# Patient Record
Sex: FEMALE
[De-identification: names, ages, dates, MRNs, and addresses within clinical notes are randomized per-mention and may not be internally consistent; named-entity substitution may affect disease eponyms.]

---

## 2024-02-23 ENCOUNTER — TELEPHONE (OUTPATIENT)
Facility: HOSPITAL | Age: 49
End: 2024-02-23

## 2024-02-26 ENCOUNTER — HOSPITAL ENCOUNTER (OUTPATIENT)
Facility: HOSPITAL | Age: 49
Setting detail: RECURRING SERIES
Discharge: HOME | End: 2024-02-29
Payer: COMMERCIAL

## 2024-02-26 PROCEDURE — 97110 THERAPEUTIC EXERCISES: CPT

## 2024-02-26 PROCEDURE — 97161 PT EVAL LOW COMPLEX 20 MIN: CPT

## 2024-02-26 NOTE — PROGRESS NOTES
[facility]  [address] [address]  Phone: [phone]   Fax: [phone]    Plan of Care/ Statement of Necessity for Physical Therapy Services    Patient name: Magdalena Arriola Start of Care: 2024   Referral source: Ora Lutz PA : 1975    Medical Diagnosis: Pain in left knee [M25.562]  Pain in right knee [M25.561]      Onset Date:2023 ~    Treatment Diagnosis:  M25.561  RIGHT KNEE PAIN and M25.562  LEFT KNEE PAIN                                           Prior Hospitalization: see medical history Provider#: [id number]   Medications: Verified on Patient Summary List     Comorbidities: Thyroid, anxiety/depression   Prior Level of Function: Aerobic exercise, Ballet       The Plan of Care and following information is based on the information from the initial evaluation.    Assessment/ wright information: Magdalena Durán is a 50 yo female that  presents with left and right knee pain off and on over the last few years becoming progressively worse since 2023 when increasing exercise intensity in the gym. She has reduced bilateral LE strength, most notably lateral hip strength. She has decreased balance and ambulates with an antalgic gait pattern. Patient presentation is consistent with PFPS of  bilateral knees. Patient will benefit from skilled PT services to modify and progress therapeutic interventions, address functional mobility deficits, address ROM deficits, address strength deficits, analyze and address soft tissue restrictions, analyze and cue movement patterns, analyze and modify body mechanics/ergonomics and assess and modify postural abnormalities to attain her goals.     Evaluation Complexity HistoryMEDIUM  Complexity : 1-2 comorbidities / personal factors will impact the outcome/ POC  ; Examination LOW Complexity : 1-2 Standardized tests and measures addressing body structure, function, activity limitation and / or participation in recreation

## 2024-02-26 NOTE — PROGRESS NOTES
PT DAILY TREATMENT NOTE/KNEE EVAL 10-18    Patient Name: Magdalena Arriola  Date:2024  : 1975  [x]  Patient  Verified  Payor: JORGE / Plan: [employer]SEM[facility] [place] / Product Type: *No Product type* /    In time:1130  Out time:1225  Total Treatment Time (min): 55  Visit #: 1 of 16    Medicare/BCBS Only   Total Timed Codes (min):  23 1:1 Treatment Time:  55     Treatment Area: Pain in left knee [M25.562]  Pain in right knee [M25.561]    SUBJECTIVE  Pain Level (0-10 scale): 3  []constant [x]intermittent []improving []worsening []no change since onset    Any medication changes, allergies to medications, adverse drug reactions, diagnosis change, or new procedure performed?: [x] No    [] Yes (see summary sheet for update)  Subjective functional status/changes:       Patient presents with left and right knee pain off and on over the last few years becoming progressively worse since 2023 when increasing exercise intensity in the gym. Patient describes pain as ache. Denies numbness/tingling. Denies popping/clicking. Aggravating factors:Exercise, weight bearing activities. Alleviating factors: rest.  Denies red flags: SOB, chest pain, dizziness/lightheadedness, blurred/double vision, HA, chills/fevers, night sweats, change in bowel/bladder control, abdominal pain, difficulty swallowing, slurred speech, unexplained weight gain/loss, nausea, vomiting. PMHx: Thyroid, anxiety/depression. Surgical Hx: Skin CA re. Social Hx:  lives spouse in a two story home, work status: Professor. PLOF: Aerobic exercise, Ballet.     OBJECTIVE/EXAMINATION      32 min [x]Eval                  []Re-Eval       23 min Therapeutic Exercise:  [x] See flow sheet :   Rationale: increase ROM, increase strength and decrease pain to improve the patient’s ability to complete ADLs        With   [x] TE   [] TA   [] neuro   [] other: Patient Education: [x] Review HEP    [] Progressed/Changed HEP based on:   [] positioning   [] body mechanics   []

## 2024-02-28 ENCOUNTER — HOSPITAL ENCOUNTER (OUTPATIENT)
Facility: HOSPITAL | Age: 49
Setting detail: RECURRING SERIES
Discharge: HOME | End: 2024-03-02
Payer: COMMERCIAL

## 2024-02-28 PROCEDURE — 97112 NEUROMUSCULAR REEDUCATION: CPT

## 2024-02-28 PROCEDURE — 97530 THERAPEUTIC ACTIVITIES: CPT

## 2024-02-28 PROCEDURE — 97110 THERAPEUTIC EXERCISES: CPT

## 2024-02-28 NOTE — PROGRESS NOTES
8 weeks:                    Patient will report compliance with HEP a least 3-4x/week to aid in rehabilitation/strengthening program.                 Status at IE: NA                 Current:Same as IE                    Patient will increase bilateral LE strength to 5/5 MMT throughout to aid in completion of ADLs.                 Status at IE:4-/5                 Current:Same as IE                    Patient will report pain no greater than 0-1/10 throughout entire day to aid in completion of ADLs.                 Status at IE:2-3/10                 Current:Same as IE                    Patent will be able to walk on treadmill for 30 mins to be able to ambulate community distances to complete ADLs.                 Status at IE:2/10 pain                 Current:Same as IE                    Patient will improve FOTO score to 74 points to demonstrate improvement in functional status.                 Status at IE:FOTO score = 67 (an established functional score where 100 = no disability)                 Current:Same as IE     PLAN  Yes  Continue plan of care  []  Upgrade activities as tolerated  []  Discharge due to :  []  Other:    Brown Calvo PT    2/28/2024    11:57 AM    Future Appointments   Date Time Provider Department Center   2/28/2024 12:10 PM Brown Calvo, PT [facility]   3/4/2024  2:10 PM Brown Calvo, PT [facility]   3/6/2024  2:10 PM Brown Calvo, PT [facility]   3/11/2024 10:50 AM Brown Calvo, PT [facility]   3/13/2024  1:30 PM Brown Calvo, PT [facility]

## 2024-03-04 ENCOUNTER — HOSPITAL ENCOUNTER (OUTPATIENT)
Facility: HOSPITAL | Age: 49
Setting detail: RECURRING SERIES
Discharge: HOME | End: 2024-03-07
Payer: COMMERCIAL

## 2024-03-04 PROCEDURE — 97112 NEUROMUSCULAR REEDUCATION: CPT

## 2024-03-04 PROCEDURE — 97110 THERAPEUTIC EXERCISES: CPT

## 2024-03-04 PROCEDURE — 97530 THERAPEUTIC ACTIVITIES: CPT

## 2024-03-04 NOTE — PROGRESS NOTES
[id number] Therapeutic Activity (timed):  use of dynamic activities replicating functional movements to increase ROM, strength, coordination, balance, and proprioception in order to improve patient's ability to progress to PLOF and address remaining functional goals.  (see flow sheet as applicable)     Details if applicable:     54  [facility] Totals Reminder: bill using total billable min of TIMED therapeutic procedures (example: do not include dry needle or estim unattended, both untimed codes, in totals to left)  8-22 min = 1 unit; 23-37 min = 2 units; 38-52 min = 3 units; 53-67 min = 4 units; 68-82 min = 5 units   Total Total     TOTAL TREATMENT TIME:        54     [x]  Patient Education billed concurrently with other procedures   [x] Review HEP    [] Progressed/Changed HEP, detail:    [] Other detail:       Objective Information/Functional Measures/Assessment    Patient required cues to recall exercise mechanics and parameters. She demonstrated improved activity toerlance. Advanced exercise by introducing leg press. She was challenge with exercise prescribed.     Patient will continue to benefit from skilled PT services to modify and progress therapeutic interventions, analyze and address functional mobility deficits, analyze and address ROM deficits, analyze and address strength deficits, analyze and address soft tissue restrictions, analyze and cue for proper movement patterns, analyze and modify for postural abnormalities, analyze and address imbalance/dizziness, and instruct in home and community integration to address functional deficits and attain remaining goals.    Progress toward goals / Updated goals:  []  See Progress Note/Recertification    Short Term Goals: To be accomplished in 4 weeks:                 Patient will report compliance with HEP 1x/day to aid in rehabilitation program.                 Status at IE: provided initial HEP                 Current: In-progress, reviewed HEP, 2/28/2024     Long Term

## 2024-03-06 ENCOUNTER — HOSPITAL ENCOUNTER (OUTPATIENT)
Facility: HOSPITAL | Age: 49
Setting detail: RECURRING SERIES
Discharge: HOME | End: 2024-03-09
Payer: COMMERCIAL

## 2024-03-06 PROCEDURE — 97112 NEUROMUSCULAR REEDUCATION: CPT

## 2024-03-06 PROCEDURE — 97530 THERAPEUTIC ACTIVITIES: CPT

## 2024-03-06 PROCEDURE — 97110 THERAPEUTIC EXERCISES: CPT

## 2024-03-06 NOTE — PROGRESS NOTES
PHYSICAL / OCCUPATIONAL THERAPY - DAILY TREATMENT NOTE     Patient Name: Magdalena Arriola    Date: 3/6/2024    : 1975  Insurance: Payor: JORGE / Plan: [facility] [place] / Product Type: *No Product type* /      Patient  verified Yes     Visit #   Current / Total 4 16   Time   In / Out 1410 1455   Pain   In / Out 3 0   Subjective Functional Status/Changes: Patient reports that she has mild low back pain today.    Changes to:  Allergies, Med Hx, Sx Hx?   no       TREATMENT AREA =  Pain in left knee [M25.562]  Pain in right knee [M25.561]    OBJECTIVE    Modalities Rationale:     decrease edema, decrease inflammation, decrease pain, increase tissue extensibility, and increase muscle contraction/control to improve patient's ability to progress to PLOF and address remaining functional goals.    0 min [x]  Vasopneumatic Device, press/temp:    If using vaso (only need to measure limb vaso being performed on)      pre-treatment girth :       post-treatment girth :       measured at (landmark location) :      min []  Other:    Skin assessment post-treatment (if applicable):    []  intact    []  redness- no adverse reaction                 []redness - adverse reaction:         Therapeutic Procedures:  Tx Min Billable or 1:1 Min (if diff from Tx Min) Procedure, Rationale, Specifics   25  [id number] Therapeutic Exercise (timed):  increase ROM, strength, coordination, balance, and proprioception to improve patient's ability to progress to PLOF and address remaining functional goals. (see flow sheet as applicable)    Details if applicable:       10  82749 Neuromuscular Re-Education (timed):  improve balance, coordination, kinesthetic sense, posture, core stability and proprioception to improve patient's ability to develop conscious control of individual muscles and awareness of position of extremities in order to progress to PLOF and address remaining functional goals. (see flow sheet as applicable)    Details if applicable:     10

## 2024-03-11 ENCOUNTER — HOSPITAL ENCOUNTER (OUTPATIENT)
Facility: HOSPITAL | Age: 49
Setting detail: RECURRING SERIES
Discharge: HOME | End: 2024-03-14
Payer: COMMERCIAL

## 2024-03-11 PROCEDURE — 97112 NEUROMUSCULAR REEDUCATION: CPT

## 2024-03-11 PROCEDURE — 97530 THERAPEUTIC ACTIVITIES: CPT

## 2024-03-11 PROCEDURE — 97110 THERAPEUTIC EXERCISES: CPT

## 2024-03-11 NOTE — PROGRESS NOTES
Activity (timed):  use of dynamic activities replicating functional movements to increase ROM, strength, coordination, balance, and proprioception in order to improve patient's ability to progress to PLOF and address remaining functional goals.  (see flow sheet as applicable)     Details if applicable:     39  [facility] Totals Reminder: bill using total billable min of TIMED therapeutic procedures (example: do not include dry needle or estim unattended, both untimed codes, in totals to left)  8-22 min = 1 unit; 23-37 min = 2 units; 38-52 min = 3 units; 53-67 min = 4 units; 68-82 min = 5 units   Total Total     TOTAL TREATMENT TIME:       39     [x]  Patient Education billed concurrently with other procedures   [x] Review HEP    [] Progressed/Changed HEP, detail:    [] Other detail:       Objective Information/Functional Measures/Assessment    Advanced exercise by increasing resistance. Introduced core stabilization exercise via PPT with marching. Provided tactile cues to promote proper mechanics. She was challenged with exercise prescribed.     Patient will continue to benefit from skilled PT services to modify and progress therapeutic interventions, analyze and address functional mobility deficits, analyze and address ROM deficits, analyze and address strength deficits, analyze and address soft tissue restrictions, analyze and cue for proper movement patterns, analyze and modify for postural abnormalities, analyze and address imbalance/dizziness, and instruct in home and community integration to address functional deficits and attain remaining goals.    Progress toward goals / Updated goals:  []  See Progress Note/Recertification    Short Term Goals: To be accomplished in 4 weeks:                 Patient will report compliance with HEP 1x/day to aid in rehabilitation program.                 Status at IE: provided initial HEP                 Current: Met, 3/11/2024     Long Term Goals: To be accomplished in  8 weeks:

## 2024-03-13 ENCOUNTER — HOSPITAL ENCOUNTER (OUTPATIENT)
Facility: HOSPITAL | Age: 49
Setting detail: RECURRING SERIES
Discharge: HOME | End: 2024-03-16
Payer: COMMERCIAL

## 2024-03-13 PROCEDURE — 97110 THERAPEUTIC EXERCISES: CPT

## 2024-03-13 PROCEDURE — 97530 THERAPEUTIC ACTIVITIES: CPT

## 2024-03-13 PROCEDURE — 97112 NEUROMUSCULAR REEDUCATION: CPT

## 2024-03-18 ENCOUNTER — HOSPITAL ENCOUNTER (OUTPATIENT)
Facility: HOSPITAL | Age: 49
Setting detail: RECURRING SERIES
Discharge: HOME | End: 2024-03-21
Payer: COMMERCIAL

## 2024-03-18 PROCEDURE — 97530 THERAPEUTIC ACTIVITIES: CPT

## 2024-03-18 PROCEDURE — 97110 THERAPEUTIC EXERCISES: CPT

## 2024-03-18 PROCEDURE — 97112 NEUROMUSCULAR REEDUCATION: CPT

## 2024-03-18 NOTE — PROGRESS NOTES
PHYSICAL / OCCUPATIONAL THERAPY - DAILY TREATMENT NOTE     Patient Name: Magdalena Arriola    Date: 3/18/2024    : 1975  Insurance: Payor: JORGE / Plan: [facility] [place] / Product Type: *No Product type* /      Patient  verified Yes     Visit #   Current / Total 7 16   Time   In / Out 1413 1506   Pain   In / Out 2-3 0   Subjective Functional Status/Changes: Patient reports that she did 40 minutes on elliptical machine and has low back and bilateral knee soreness.    Changes to:  Allergies, Med Hx, Sx Hx?   no       TREATMENT AREA =  Pain in left knee [M25.562]  Pain in right knee [M25.561]    OBJECTIVE    Modalities Rationale:     decrease edema, decrease inflammation, decrease pain, increase tissue extensibility, and increase muscle contraction/control to improve patient's ability to progress to PLOF and address remaining functional goals.    0 min [x]  Vasopneumatic Device, press/temp:    If using vaso (only need to measure limb vaso being performed on)      pre-treatment girth :       post-treatment girth :       measured at (landmark location) :      min []  Other:    Skin assessment post-treatment (if applicable):    []  intact    []  redness- no adverse reaction                 []redness - adverse reaction:         Therapeutic Procedures:  Tx Min Billable or 1:1 Min (if diff from Tx Min) Procedure, Rationale, Specifics   25  64859 Therapeutic Exercise (timed):  increase ROM, strength, coordination, balance, and proprioception to improve patient's ability to progress to PLOF and address remaining functional goals. (see flow sheet as applicable)    Details if applicable:       10  52432 Neuromuscular Re-Education (timed):  improve balance, coordination, kinesthetic sense, posture, core stability and proprioception to improve patient's ability to develop conscious control of individual muscles and awareness of position of extremities in order to progress to PLOF and address remaining functional goals. (see flow

## 2024-03-20 ENCOUNTER — HOSPITAL ENCOUNTER (OUTPATIENT)
Facility: HOSPITAL | Age: 49
Setting detail: RECURRING SERIES
Discharge: HOME | End: 2024-03-23
Payer: COMMERCIAL

## 2024-03-20 PROCEDURE — 97112 NEUROMUSCULAR REEDUCATION: CPT

## 2024-03-20 PROCEDURE — 97530 THERAPEUTIC ACTIVITIES: CPT

## 2024-03-20 PROCEDURE — 97110 THERAPEUTIC EXERCISES: CPT

## 2024-03-20 NOTE — PROGRESS NOTES
PHYSICAL / OCCUPATIONAL THERAPY - DAILY TREATMENT NOTE     Patient Name: Magdalena Arriola    Date: 3/20/2024    : 1975  Insurance: Payor: JORGE / Plan: [employer] [place] / Product Type: *No Product type* /      Patient  verified Yes     Visit #   Current / Total 8 16   Time   In / Out 1410 1515   Pain   In / Out 1-2 0   Subjective Functional Status/Changes: Patient reports mild low back pain.    Changes to:  Allergies, Med Hx, Sx Hx?   no       TREATMENT AREA =  Pain in left knee [M25.562]  Pain in right knee [M25.561]    OBJECTIVE    Modalities Rationale:     decrease edema, decrease inflammation, decrease pain, increase tissue extensibility, and increase muscle contraction/control to improve patient's ability to progress to PLOF and address remaining functional goals.    0 min [x]  Vasopneumatic Device, press/temp:    If using vaso (only need to measure limb vaso being performed on)      pre-treatment girth :       post-treatment girth :       measured at (landmark location) :      min []  Other:    Skin assessment post-treatment (if applicable):    []  intact    []  redness- no adverse reaction                 []redness - adverse reaction:         Therapeutic Procedures:  Tx Min Billable or 1:1 Min (if diff from Tx Min) Procedure, Rationale, Specifics   30 20 72995 Therapeutic Exercise (timed):  increase ROM, strength, coordination, balance, and proprioception to improve patient's ability to progress to PLOF and address remaining functional goals. (see flow sheet as applicable)    Details if applicable:       15 10 95773 Neuromuscular Re-Education (timed):  improve balance, coordination, kinesthetic sense, posture, core stability and proprioception to improve patient's ability to develop conscious control of individual muscles and awareness of position of extremities in order to progress to PLOF and address remaining functional goals. (see flow sheet as applicable)    Details if applicable:     20 10 24402

## 2024-03-25 ENCOUNTER — HOSPITAL ENCOUNTER (OUTPATIENT)
Facility: HOSPITAL | Age: 49
Setting detail: RECURRING SERIES
Discharge: HOME | End: 2024-03-28
Payer: COMMERCIAL

## 2024-03-25 PROCEDURE — 97112 NEUROMUSCULAR REEDUCATION: CPT

## 2024-03-25 PROCEDURE — 97530 THERAPEUTIC ACTIVITIES: CPT

## 2024-03-25 PROCEDURE — 97110 THERAPEUTIC EXERCISES: CPT

## 2024-03-25 NOTE — PROGRESS NOTES
20 12 00295 Therapeutic Activity (timed):  use of dynamic activities replicating functional movements to increase ROM, strength, coordination, balance, and proprioception in order to improve patient's ability to progress to PLOF and address remaining functional goals.  (see flow sheet as applicable)     Details if applicable:     58 40 [facility] Totals Reminder: bill using total billable min of TIMED therapeutic procedures (example: do not include dry needle or estim unattended, both untimed codes, in totals to left)  8-22 min = 1 unit; 23-37 min = 2 units; 38-52 min = 3 units; 53-67 min = 4 units; 68-82 min = 5 units   Total Total     TOTAL TREATMENT TIME:   40     [x]  Patient Education billed concurrently with other procedures   [x] Review HEP    [] Progressed/Changed HEP, detail:    [] Other detail:       Objective Information/Functional Measures/Assessment    Patient demonstrated improved autonomy with exercise prescribed. She has little to no c/o knee pain with exercise prescribed. Will reassess next visit for potential discharge.     Patient will continue to benefit from skilled PT services to modify and progress therapeutic interventions, analyze and address functional mobility deficits, analyze and address ROM deficits, analyze and address strength deficits, analyze and address soft tissue restrictions, analyze and cue for proper movement patterns, analyze and modify for postural abnormalities, analyze and address imbalance/dizziness, and instruct in home and community integration to address functional deficits and attain remaining goals.    Progress toward goals / Updated goals:  []  See Progress Note/Recertification    Short Term Goals: To be accomplished in 4 weeks:                 Patient will report compliance with HEP 1x/day to aid in rehabilitation program.                 Status at IE: provided initial HEP                 Current: Met, 3/11/2024     Long Term Goals: To be accomplished in  8 weeks:

## 2024-03-27 ENCOUNTER — HOSPITAL ENCOUNTER (OUTPATIENT)
Facility: HOSPITAL | Age: 49
Setting detail: RECURRING SERIES
Discharge: HOME | End: 2024-03-30
Payer: COMMERCIAL

## 2024-03-27 PROCEDURE — 97112 NEUROMUSCULAR REEDUCATION: CPT

## 2024-03-27 PROCEDURE — 97530 THERAPEUTIC ACTIVITIES: CPT

## 2024-03-27 PROCEDURE — 97110 THERAPEUTIC EXERCISES: CPT

## 2024-03-27 NOTE — PROGRESS NOTES
[facility]  [address] [address]  Phone: [phone]   Fax: [phone]    Discharge Summary    Patient name: Magdalena Arriola     Start of Care: 2024   Referral source: Ora Lutz PA    : 1975  Medical/Treatment Diagnosis: Pain in left knee [M25.562]  Pain in right knee [M25.561]  Onset Date:2023 ~   Prior Hospitalization: see medical history   Provider#: [id number]  Medications: Verified on Patient Summary List    Comorbidities: Thyroid, anxiety/depression   Prior Level of Function:Aerobic exercise, Ballet     Visits from Start of Care: 10        Goals/Measure of Progress:  Short Term Goals: To be accomplished in 4 weeks:                 Patient will report compliance with HEP 1x/day to aid in rehabilitation program.                 Status at IE: provided initial HEP                 Current: Met, 3/11/2024     Long Term Goals: To be accomplished in  8 weeks:                    Patient will report compliance with HEP a least 3-4x/week to aid in rehabilitation/strengthening program.                 Status at IE: NA                 Current: Met, 3/27/2024                    Patient will increase bilateral LE strength to 5/5 MMT throughout to aid in completion of ADLs.                 Status at IE:4-/5                 Current: Partially Met, 4+/5, 3/12/2024                    Patient will report pain no greater than 0-1/10 throughout entire day to aid in completion of ADLs.                 Status at IE:2-3/10                 Current: In-progress, 0-2/10, 3/4/2024                    Patent will be able to walk on treadmill for 30 mins to be able to ambulate community distances to complete ADLs.                 Status at IE:2/10 pain                 Current: Met, 3/25/2024                    Patient will improve FOTO score to 74 points to demonstrate improvement in functional status.                 Status at IE:FOTO score = 67 (an established 
  Status at IE:4-/5                 Current: Partially Met, 4+/5, 3/12/2024                    Patient will report pain no greater than 0-1/10 throughout entire day to aid in completion of ADLs.                 Status at IE:2-3/10                 Current: In-progress, 0-2/10, 3/4/2024                    Patent will be able to walk on treadmill for 30 mins to be able to ambulate community distances to complete ADLs.                 Status at IE:2/10 pain                 Current: Met, 3/25/2024                    Patient will improve FOTO score to 74 points to demonstrate improvement in functional status.                 Status at IE:FOTO score = 67 (an established functional score where 100 = no disability)                 Current: Met, 78, 3/27/2024    PLAN  No  Continue plan of care  []  Upgrade activities as tolerated  [x]  Discharge due to : Goals Met  []  Other:    Brown Calvo PT    3/27/2024    2:14 PM    Future Appointments   Date Time Provider Department Center   4/3/2024  2:10 PM Brown Calvo, PT [facility]   4/8/2024  2:10 PM Brown Calvo, PT [facility]   4/10/2024  2:10 PM Brown Calvo, PT [facility]   4/15/2024  2:10 PM Brown Calvo, PT [facility]   4/17/2024  2:10 PM Brown Calvo, PT [facility]   4/22/2024  2:10 PM Brown Calvo, PT [facility]   4/24/2024  2:10 PM Brown Calvo, PT [facility]

## 2024-04-01 ENCOUNTER — APPOINTMENT (OUTPATIENT)
Facility: HOSPITAL | Age: 49
End: 2024-04-01
Payer: COMMERCIAL

## 2024-04-03 ENCOUNTER — HOSPITAL ENCOUNTER (OUTPATIENT)
Facility: HOSPITAL | Age: 49
Setting detail: RECURRING SERIES
Discharge: HOME | End: 2024-04-06
Payer: COMMERCIAL

## 2024-04-03 PROCEDURE — 97110 THERAPEUTIC EXERCISES: CPT

## 2024-04-03 PROCEDURE — 97161 PT EVAL LOW COMPLEX 20 MIN: CPT

## 2024-04-03 NOTE — PROGRESS NOTES
[facility] at [facility]  [address] [address]  Phone: [phone]   Fax: [phone]    Plan of Care/ Statement of Necessity for Physical Therapy Services        Patient name: Magdalena Arriola Start of Care: 4/3/2024   Referral source: Ora Lutz PA : 1975    Medical Diagnosis: Other low back pain [M54.59]      Onset Date:4/3/2021    Treatment Diagnosis:  M54.59  OTHER LOWER BACK PAIN                                          Prior Hospitalization: see medical history Provider#: [id number]   Medications: Verified on Patient Summary List     Comorbidities: : Thyroid, anxiety/depression   Prior Level of Function: Aerobic exercise, Ballet       The Plan of Care and following information is based on the information from the initial evaluation.    Assessment/ wright information: Magdalena Arriola is a 48 yo female that presents with c/o low back pain for the last few year when lifting heavy items but she reports no specific mechanism SAVANNAH. She has reduced postural strength and awareness. She demonstrates decreased bilateral LE strength and reduced abdominal strength. She was positive during SIJ cluster testing. Patient presentation is consistent with SIJ dysfunction. Patient will benefit from skilled PT services to modify and progress therapeutic interventions, address functional mobility deficits, address ROM deficits, address strength deficits, analyze and address soft tissue restrictions, analyze and cue movement patterns, analyze and modify body mechanics/ergonomics and assess and modify postural abnormalities to attain her goals.     Evaluation Complexity HistoryMEDIUM  Complexity : 1-2 comorbidities / personal factors will impact the outcome/ POC  ; Examination LOW Complexity : 1-2 Standardized tests and measures addressing body structure, function, activity limitation and / or participation in recreation  ;Presentation LOW Complexity : Stable, uncomplicated  ;Clinical Decision 
Evaluation - Lumbar Spine    OBJECTIVE  Posture:  Forward head and rounded shoulders.     Gait:  [x] Normal     [] Abnormal:    Active Movements: [] N/A   [] Too acute   [] Other:  ROM % AROM Comments:pain, area   Forward flexion 40-60 100    Extension 20-30 100    SB right 20-30 100    SB left 20-30 100    Rotation right 5-10 100    Rotation left 5-10 100      Repeated Movements   Effects on present pain: produces (SC), abolishes (A), increases (incr), decreases (decr), centralizes (C), peripheral (PH), no effect (NE)   Pre-Test Sx Flexion Repeated Flexion Extension Repeated Extension Repeated SBL Repeated SBR   Sitting          Standing  NE NE NE NE NE NE   Lying      N/A N/A       Neuro Screen [x] WNL  Myotome/Dermatome/Reflexes:  Comments:    Dural Mobility:  SLR Supine: [] R    [] L    [] +    [x] -    Slump Test: [] R    [] L    [] +    [x] -    Prone Knee Bend: [] R    [] L    [] +    [x] -     Palpation  [] Min  [x] Mod  [] Severe    Location: left sacral border and left PSIS      Strength   L(0-5) R (0-5) N/T   Hip Flexion (L1,2) 4- 4 []   Knee Extension (L3,4) 4 4 []   Ankle Dorsiflexion (L4) 4 4 []   Great Toe Extension (L5) 4 4 []   Ankle Plantarflexion (S1) 4 4 []   Knee Flexion (S1,2) 4 4 []   Abdominals 4- 4- []   Paraspinals 4- 4- []   Back Rotators 4 4 []   Gluteus Todd 3+ 4 []   Hip Abduction 4 4 []       Special Tests    Sacroilliac:  Gaenslen's: [] R    [] L    [x] +    [] -     Compression: [x] +    [] -     Gapping:  [x] +    [] -     Thigh Thrust: [] R    [] L    [] +    [x] -            Hip: Parisa:  [] R    [] L    [] +    [x] -     Scour:  [] R    [] L    [] +    [x] -     Piriformis: [] R    [x] L    [x] +    [] -     Other tests/comments:       Pain Level (0-10 scale) post treatment: 0    ASSESSMENT/Changes in Function:     Patient presents with c/o low back pain for the last few year when lifting heavy items with no specific mechanism SAVANNAH. She has reduced postural strength and

## 2024-04-08 ENCOUNTER — APPOINTMENT (OUTPATIENT)
Facility: HOSPITAL | Age: 49
End: 2024-04-08
Payer: COMMERCIAL

## 2024-04-10 ENCOUNTER — HOSPITAL ENCOUNTER (OUTPATIENT)
Facility: HOSPITAL | Age: 49
Setting detail: RECURRING SERIES
Discharge: HOME | End: 2024-04-13
Payer: COMMERCIAL

## 2024-04-10 PROCEDURE — 97110 THERAPEUTIC EXERCISES: CPT

## 2024-04-10 PROCEDURE — 97530 THERAPEUTIC ACTIVITIES: CPT

## 2024-04-10 PROCEDURE — 97112 NEUROMUSCULAR REEDUCATION: CPT

## 2024-04-10 NOTE — PROGRESS NOTES
exacerbation of symptoms to promote tolerance to ADLs.                 Status at IE:7/10                 Current:Same as IE                    Patient will improve FOTO score to 67 points to demonstrate improvement in functional status.                 Status at IE:FOTO score = 57 (an established functional score where 100 = no disability)                 Current:Same as IE     PLAN  Yes  Continue plan of care  []  Upgrade activities as tolerated  []  Discharge due to :  []  Other:    Brown Calvo PT    4/10/2024    3:01 PM    Future Appointments   Date Time Provider Department Center   4/15/2024  2:10 PM Borwn Calvo, PT [facility]   4/17/2024  2:10 PM Brown Calvo, PT [facility]   4/22/2024  2:10 PM Brown Calvo, PT [facility]   4/24/2024  2:10 PM Brown Calvo, PT [facility]   4/29/2024  2:10 PM Brown Calvo, PT [facility]   5/1/2024  2:10 PM Brown Calvo, PT [facility]   5/6/2024  2:10 PM Brown Calvo, PT [facility]   5/8/2024  2:10 PM Brown Calvo, PT [facility]   5/13/2024  2:10 PM Brown Calvo, PT [facility]   5/15/2024  2:10 PM Brown Calvo, PT [facility]   5/20/2024  2:10 PM Brown Calvo, PT [facility]   5/22/2024  2:10 PM Brown Calvo, PT [facility]

## 2024-04-15 ENCOUNTER — HOSPITAL ENCOUNTER (OUTPATIENT)
Facility: HOSPITAL | Age: 49
Setting detail: RECURRING SERIES
Discharge: HOME | End: 2024-04-18
Payer: COMMERCIAL

## 2024-04-15 PROCEDURE — 97112 NEUROMUSCULAR REEDUCATION: CPT

## 2024-04-15 PROCEDURE — 97110 THERAPEUTIC EXERCISES: CPT

## 2024-04-15 PROCEDURE — 97530 THERAPEUTIC ACTIVITIES: CPT

## 2024-04-15 NOTE — PROGRESS NOTES
be able to lift heavy object from the floor without exacerbation of symptoms to promote tolerance to ADLs.                 Status at IE:7/10                 Current: In-progress, introduce dead lift, 4/15/2024                    Patient will improve FOTO score to 67 points to demonstrate improvement in functional status.                 Status at IE:FOTO score = 57 (an established functional score where 100 = no disability)                 Current:Same as IE     PLAN  Yes  Continue plan of care  []  Upgrade activities as tolerated  []  Discharge due to :  []  Other:    Brown Calvo PT    4/15/2024    3:13 PM    Future Appointments   Date Time Provider Department Center   4/17/2024  2:10 PM Brown Calvo, PT [facility]   4/22/2024  2:10 PM Brown Calvo, PT [facility]   4/24/2024  2:10 PM Brown Calvo, PT [facility]   4/29/2024  2:10 PM Brown Calvo, PT [facility]   5/1/2024  2:10 PM Brown Calvo, PT [facility]   5/6/2024  2:10 PM Brown Calvo, PT [facility]   5/8/2024  2:10 PM Brown Calvo, PT [facility]   5/13/2024  2:10 PM Brown Calvo, PT [facility]   5/15/2024  2:10 PM Brown Calvo, PT [facility]   5/20/2024  2:10 PM Brown Calvo, PT [facility]   5/22/2024  2:10 PM Brown Calvo, PT [facility]

## 2024-04-17 ENCOUNTER — HOSPITAL ENCOUNTER (OUTPATIENT)
Facility: HOSPITAL | Age: 49
Setting detail: RECURRING SERIES
Discharge: HOME | End: 2024-04-20
Payer: COMMERCIAL

## 2024-04-17 PROCEDURE — 97530 THERAPEUTIC ACTIVITIES: CPT

## 2024-04-17 PROCEDURE — 97112 NEUROMUSCULAR REEDUCATION: CPT

## 2024-04-17 PROCEDURE — 97110 THERAPEUTIC EXERCISES: CPT

## 2024-04-17 NOTE — PROGRESS NOTES
PHYSICAL / OCCUPATIONAL THERAPY - DAILY TREATMENT NOTE     Patient Name: Magdalena Arriola    Date: 2024    : 1975  Insurance: Payor: JORGE / Plan: [facility] [place] / Product Type: *No Product type* /      Patient  verified Yes     Visit #   Current / Total 4 16   Time   In / Out 1410 1510   Pain   In / Out 0 0   Subjective Functional Status/Changes: \"I feel better today I have less frequent LBP.\"   Changes to:  Allergies, Med Hx, Sx Hx?   no       TREATMENT AREA =  Other low back pain [M54.59]    OBJECTIVE    Therapeutic Procedures:  Tx Min Billable or 1:1 Min (if diff from Tx Min) Procedure, Rationale, Specifics   30  25176 Therapeutic Exercise (timed):  increase ROM, strength, coordination, balance, and proprioception to improve patient's ability to progress to PLOF and address remaining functional goals. (see flow sheet as applicable)    Details if applicable:       10  98293 Neuromuscular Re-Education (timed):  improve balance, coordination, kinesthetic sense, posture, core stability and proprioception to improve patient's ability to develop conscious control of individual muscles and awareness of position of extremities in order to progress to PLOF and address remaining functional goals. (see flow sheet as applicable)    Details if applicable:     20  35665 Therapeutic Activity (timed):  use of dynamic activities replicating functional movements to increase ROM, strength, coordination, balance, and proprioception in order to improve patient's ability to progress to PLOF and address remaining functional goals.  (see flow sheet as applicable)     Details if applicable:     60  [facility] Totals Reminder: bill using total billable min of TIMED therapeutic procedures (example: do not include dry needle or estim unattended, both untimed codes, in totals to left)  8-22 min = 1 unit; 23-37 min = 2 units; 38-52 min = 3 units; 53-67 min = 4 units; 68-82 min = 5 units   Total Total     TOTAL TREATMENT TIME:      60

## 2024-04-22 ENCOUNTER — HOSPITAL ENCOUNTER (OUTPATIENT)
Facility: HOSPITAL | Age: 49
Setting detail: RECURRING SERIES
Discharge: HOME | End: 2024-04-25
Payer: COMMERCIAL

## 2024-04-22 PROCEDURE — 97110 THERAPEUTIC EXERCISES: CPT

## 2024-04-22 PROCEDURE — 97530 THERAPEUTIC ACTIVITIES: CPT

## 2024-04-22 PROCEDURE — 97112 NEUROMUSCULAR REEDUCATION: CPT

## 2024-04-22 NOTE — PROGRESS NOTES
Education billed concurrently with other procedures   [x] Review HEP    [] Progressed/Changed HEP, detail:    [] Other detail:       Objective Information/Functional Measures/Assessment    Provided verbal cues to promote proper rotator recruitment during  kegel exercise. She was challenged with during anti rotation exercise. Will continue to advance exercise as tolerated.    Patient will continue to benefit from skilled PT services to modify and progress therapeutic interventions, analyze and address functional mobility deficits, analyze and address ROM deficits, analyze and address strength deficits, analyze and address soft tissue restrictions, analyze and cue for proper movement patterns, analyze and modify for postural abnormalities, analyze and address imbalance/dizziness, and instruct in home and community integration to address functional deficits and attain remaining goals.    Progress toward goals / Updated goals:  []  See Progress Note/Recertification    Short Term Goals: To be accomplished in 4 weeks:                 Patient will report compliance with HEP 1x/day to aid in rehabilitation program.                 Status at IE: provided initial HEP                 Current: In-progress, reviewed HEP, 4/10/2024     Long Term Goals: To be accomplished in  8 weeks:                    Patient will report compliance with HEP a least 3-4x/week to aid in rehabilitation/strengthening program.                 Status at IE: NA                 Current:Same as IE                    Patient will increase bilateral LE strength to 5/5 MMT throughout to aid in completion of ADLs.                 Status at IE:4/5                 Current:Same as IE                    Patient will report pain no greater than 0-1/10 throughout entire day to aid in completion of ADLs.                 Status at IE:0-7/10                 Current:Same as IE                    Patent will be able to lift heavy object from the floor without

## 2024-04-24 ENCOUNTER — HOSPITAL ENCOUNTER (OUTPATIENT)
Facility: HOSPITAL | Age: 49
Setting detail: RECURRING SERIES
Discharge: HOME | End: 2024-04-27
Payer: COMMERCIAL

## 2024-04-24 PROCEDURE — 97110 THERAPEUTIC EXERCISES: CPT

## 2024-04-24 PROCEDURE — 97530 THERAPEUTIC ACTIVITIES: CPT

## 2024-04-24 PROCEDURE — 97112 NEUROMUSCULAR REEDUCATION: CPT

## 2024-04-24 NOTE — PROGRESS NOTES
PHYSICAL / OCCUPATIONAL THERAPY - DAILY TREATMENT NOTE     Patient Name: Magdalena Arriola    Date: 2024    : 1975  Insurance: Payor: JORGE / Plan: [facility] [place] / Product Type: *No Product type* /      Patient  verified Yes     Visit #   Current / Total 6 16   Time   In / Out 1413 1513   Pain   In / Out 0 0   Subjective Functional Status/Changes: \"I had soreness after last visit but it goes away after a day.\"   Changes to:  Allergies, Med Hx, Sx Hx?   no       TREATMENT AREA =  Other low back pain [M54.59]    OBJECTIVE    Therapeutic Procedures:  Tx Min Billable or 1:1 Min (if diff from Tx Min) Procedure, Rationale, Specifics   30  36558 Therapeutic Exercise (timed):  increase ROM, strength, coordination, balance, and proprioception to improve patient's ability to progress to PLOF and address remaining functional goals. (see flow sheet as applicable)    Details if applicable:       20  93402 Neuromuscular Re-Education (timed):  improve balance, coordination, kinesthetic sense, posture, core stability and proprioception to improve patient's ability to develop conscious control of individual muscles and awareness of position of extremities in order to progress to PLOF and address remaining functional goals. (see flow sheet as applicable)    Details if applicable:     10  32752 Therapeutic Activity (timed):  use of dynamic activities replicating functional movements to increase ROM, strength, coordination, balance, and proprioception in order to improve patient's ability to progress to PLOF and address remaining functional goals.  (see flow sheet as applicable)     Details if applicable:     60  [facility] Totals Reminder: bill using total billable min of TIMED therapeutic procedures (example: do not include dry needle or estim unattended, both untimed codes, in totals to left)  8-22 min = 1 unit; 23-37 min = 2 units; 38-52 min = 3 units; 53-67 min = 4 units; 68-82 min = 5 units   Total Total     TOTAL TREATMENT

## 2024-04-29 ENCOUNTER — HOSPITAL ENCOUNTER (OUTPATIENT)
Facility: HOSPITAL | Age: 49
Setting detail: RECURRING SERIES
Discharge: HOME | End: 2024-05-02
Payer: COMMERCIAL

## 2024-04-29 PROCEDURE — 97112 NEUROMUSCULAR REEDUCATION: CPT

## 2024-04-29 PROCEDURE — 97110 THERAPEUTIC EXERCISES: CPT

## 2024-04-29 PROCEDURE — 97530 THERAPEUTIC ACTIVITIES: CPT

## 2024-04-29 NOTE — PROGRESS NOTES
PHYSICAL / OCCUPATIONAL THERAPY - DAILY TREATMENT NOTE     Patient Name: Magdalena Arriola    Date: 2024    : 1975  Insurance: Payor: JORGE / Plan: [facility] [place] / Product Type: *No Product type* /      Patient  verified Yes     Visit #   Current / Total 7 16   Time   In / Out 1412 1509   Pain   In / Out 0 0   Subjective Functional Status/Changes: \"I have been feeling better for the most part. I did have some complications over the weekend. But overall I am improving.\"   Changes to:  Allergies, Med Hx, Sx Hx?   no       TREATMENT AREA =  Other low back pain [M54.59]    OBJECTIVE    Therapeutic Procedures:  Tx Min Billable or 1:1 Min (if diff from Tx Min) Procedure, Rationale, Specifics   25  49321 Therapeutic Exercise (timed):  increase ROM, strength, coordination, balance, and proprioception to improve patient's ability to progress to PLOF and address remaining functional goals. (see flow sheet as applicable)    Details if applicable:       12  71306 Neuromuscular Re-Education (timed):  improve balance, coordination, kinesthetic sense, posture, core stability and proprioception to improve patient's ability to develop conscious control of individual muscles and awareness of position of extremities in order to progress to PLOF and address remaining functional goals. (see flow sheet as applicable)    Details if applicable:     20  06705 Therapeutic Activity (timed):  use of dynamic activities replicating functional movements to increase ROM, strength, coordination, balance, and proprioception in order to improve patient's ability to progress to PLOF and address remaining functional goals.  (see flow sheet as applicable)     Details if applicable:     57  [facility] Totals Reminder: bill using total billable min of TIMED therapeutic procedures (example: do not include dry needle or estim unattended, both untimed codes, in totals to left)  8-22 min = 1 unit; 23-37 min = 2 units; 38-52 min = 3 units; 53-67 min = 4

## 2024-05-01 ENCOUNTER — HOSPITAL ENCOUNTER (OUTPATIENT)
Facility: HOSPITAL | Age: 49
Setting detail: RECURRING SERIES
Discharge: HOME | End: 2024-05-04
Payer: COMMERCIAL

## 2024-05-01 PROCEDURE — 97112 NEUROMUSCULAR REEDUCATION: CPT

## 2024-05-01 PROCEDURE — 97110 THERAPEUTIC EXERCISES: CPT

## 2024-05-01 PROCEDURE — 97530 THERAPEUTIC ACTIVITIES: CPT

## 2024-05-01 NOTE — PROGRESS NOTES
PHYSICAL / OCCUPATIONAL THERAPY - DAILY TREATMENT NOTE     Patient Name: Magdalena Arriola    Date: 2024    : 1975  Insurance: Payor: JORGE / Plan: [facility] [place] / Product Type: *No Product type* /      Patient  verified Yes     Visit #   Current / Total 8 16   Time   In / Out 1408 1501   Pain   In / Out 0 0   Subjective Functional Status/Changes: \"I am sore from getting an item out of the grocery cart. It was a heavy box.\"   Changes to:  Allergies, Med Hx, Sx Hx?   no       TREATMENT AREA =  Other low back pain [M54.59]    OBJECTIVE    Therapeutic Procedures:  Tx Min Billable or 1:1 Min (if diff from Tx Min) Procedure, Rationale, Specifics   25  89612 Therapeutic Exercise (timed):  increase ROM, strength, coordination, balance, and proprioception to improve patient's ability to progress to PLOF and address remaining functional goals. (see flow sheet as applicable)    Details if applicable:       13  79607 Neuromuscular Re-Education (timed):  improve balance, coordination, kinesthetic sense, posture, core stability and proprioception to improve patient's ability to develop conscious control of individual muscles and awareness of position of extremities in order to progress to PLOF and address remaining functional goals. (see flow sheet as applicable)    Details if applicable:     15  09455 Therapeutic Activity (timed):  use of dynamic activities replicating functional movements to increase ROM, strength, coordination, balance, and proprioception in order to improve patient's ability to progress to PLOF and address remaining functional goals.  (see flow sheet as applicable)     Details if applicable:     53  [facility] Totals Reminder: bill using total billable min of TIMED therapeutic procedures (example: do not include dry needle or estim unattended, both untimed codes, in totals to left)  8-22 min = 1 unit; 23-37 min = 2 units; 38-52 min = 3 units; 53-67 min = 4 units; 68-82 min = 5 units   Total Total

## 2024-05-06 ENCOUNTER — HOSPITAL ENCOUNTER (OUTPATIENT)
Facility: HOSPITAL | Age: 49
Setting detail: RECURRING SERIES
Discharge: HOME | End: 2024-05-09
Payer: COMMERCIAL

## 2024-05-06 PROCEDURE — 97530 THERAPEUTIC ACTIVITIES: CPT

## 2024-05-06 PROCEDURE — 97110 THERAPEUTIC EXERCISES: CPT

## 2024-05-06 PROCEDURE — 97112 NEUROMUSCULAR REEDUCATION: CPT

## 2024-05-06 NOTE — PROGRESS NOTES
[facility]  [address] [address]                   Phone: [phone]   Fax: [phone]    Progress Note  Patient name: Magdalena Arriola Start of Care: 4/3/2024    Referral source: Ora Lutz PA : 1975   Medical/Treatment Diagnosis: Other low back pain [M54.59] Onset Date:4/3/2021      Prior Hospitalization: see medical history Provider#: [id number]   Medications: Verified on Patient Summary List    Comorbidities: Thyroid, anxiety/depression   Prior Level of Function:Aerobic exercise, Ballet     Visits from Start of Care: 9    Missed Visits: 0    Updated Goals/Measure of Progress:     Short Term Goals: To be accomplished in 4 weeks:                 Patient will report compliance with HEP 1x/day to aid in rehabilitation program.                 Status at IE: provided initial HEP                 Current: Met, 2024     Long Term Goals: To be accomplished in  8 weeks:                    Patient will report compliance with HEP a least 3-4x/week to aid in rehabilitation/strengthening program.                 Status at IE: NA                 Current: In-progress, advanced HEP, 2024                    Patient will increase bilateral LE strength to 5/5 MMT throughout to aid in completion of ADLs.                 Status at IE:                 Current: In-progress, 4+/5, 2024                    Patient will report pain no greater than 0-1/10 throughout entire day to aid in completion of ADLs.                 Status at IE:0-7/10                 Current:In-progress, 0-2/10, 2024                    Patent will be able to lift heavy object from the floor without exacerbation of symptoms to promote tolerance to ADLs.                 Status at IE:7/10                 Current: In-progress, requires fewer cues to perform hip hinge during dead lift, 2024                    Patient will improve FOTO score to 67 points to demonstrate improvement in 
aid in completion of ADLs.                 Status at IE:0-7/10                 Current:In-progress, 0-2/10, 5/6/2024                    Patent will be able to lift heavy object from the floor without exacerbation of symptoms to promote tolerance to ADLs.                 Status at IE:7/10                 Current: In-progress, requires fewer cues to perform hip hinge during dead lift, 5/6/2024                    Patient will improve FOTO score to 67 points to demonstrate improvement in functional status.                 Status at IE:FOTO score = 57 (an established functional score where 100 = no disability)                 Current: In-progress, 57, 5/6/2024    PLAN  Yes  Continue plan of care  []  Upgrade activities as tolerated  []  Discharge due to :  []  Other:    Brown Calvo PT    5/6/2024    10:55 AM    Future Appointments   Date Time Provider Department Center   5/8/2024  2:10 PM Brown Calvo, PT [facility]   5/13/2024  2:10 PM Brown Calvo, PT [facility]   5/15/2024  2:10 PM Brown Calvo, PT [facility]   5/20/2024  2:10 PM Brown Calvo, PT [facility]   5/22/2024  2:10 PM Brown Calvo, PT [facility]

## 2024-05-08 ENCOUNTER — HOSPITAL ENCOUNTER (OUTPATIENT)
Facility: HOSPITAL | Age: 49
Setting detail: RECURRING SERIES
Discharge: HOME | End: 2024-05-11
Payer: COMMERCIAL

## 2024-05-08 PROCEDURE — 97530 THERAPEUTIC ACTIVITIES: CPT

## 2024-05-08 PROCEDURE — 97110 THERAPEUTIC EXERCISES: CPT

## 2024-05-08 PROCEDURE — 97112 NEUROMUSCULAR REEDUCATION: CPT

## 2024-05-08 NOTE — PROGRESS NOTES
PHYSICAL / OCCUPATIONAL THERAPY - DAILY TREATMENT NOTE     Patient Name: Magdalena Arriola    Date: 2024    : 1975  Insurance: Payor: JORGE / Plan: [facility] [place] / Product Type: *No Product type* /      Patient  verified Yes     Visit #   Current / Total 9 16   Time   In / Out 1410 1448   Pain   In / Out 0 0   Subjective Functional Status/Changes: \"I am getting stronger and my symptoms are intermittent now.\"   Changes to:  Allergies, Med Hx, Sx Hx?   no       TREATMENT AREA =  Other low back pain [M54.59]    OBJECTIVE    Therapeutic Procedures:  Tx Min Billable or 1:1 Min (if diff from Tx Min) Procedure, Rationale, Specifics   20  68077 Therapeutic Exercise (timed):  increase ROM, strength, coordination, balance, and proprioception to improve patient's ability to progress to PLOF and address remaining functional goals. (see flow sheet as applicable)    Details if applicable:       10  81062 Neuromuscular Re-Education (timed):  improve balance, coordination, kinesthetic sense, posture, core stability and proprioception to improve patient's ability to develop conscious control of individual muscles and awareness of position of extremities in order to progress to PLOF and address remaining functional goals. (see flow sheet as applicable)    Details if applicable:     18  61341 Therapeutic Activity (timed):  use of dynamic activities replicating functional movements to increase ROM, strength, coordination, balance, and proprioception in order to improve patient's ability to progress to PLOF and address remaining functional goals.  (see flow sheet as applicable)     Details if applicable:     48  [facility] Totals Reminder: bill using total billable min of TIMED therapeutic procedures (example: do not include dry needle or estim unattended, both untimed codes, in totals to left)  8-22 min = 1 unit; 23-37 min = 2 units; 38-52 min = 3 units; 53-67 min = 4 units; 68-82 min = 5 units   Total Total     TOTAL TREATMENT

## 2024-05-13 ENCOUNTER — HOSPITAL ENCOUNTER (OUTPATIENT)
Facility: HOSPITAL | Age: 49
Setting detail: RECURRING SERIES
Discharge: HOME | End: 2024-05-16
Payer: COMMERCIAL

## 2024-05-13 PROCEDURE — 97112 NEUROMUSCULAR REEDUCATION: CPT

## 2024-05-13 PROCEDURE — 97110 THERAPEUTIC EXERCISES: CPT

## 2024-05-13 PROCEDURE — 97530 THERAPEUTIC ACTIVITIES: CPT

## 2024-05-13 NOTE — PROGRESS NOTES
PHYSICAL / OCCUPATIONAL THERAPY - DAILY TREATMENT NOTE     Patient Name: Magdalena Arirola    Date: 2024    : 1975  Insurance: Payor: JORGE / Plan: [facility] [place] / Product Type: *No Product type* /      Patient  verified Yes     Visit #   Current / Total 11 16   Time   In / Out 1410 1515   Pain   In / Out 0 0   Subjective Functional Status/Changes: \"I am getting stronger and my symptoms are intermittent now.\"   Changes to:  Allergies, Med Hx, Sx Hx?   no       TREATMENT AREA =  Other low back pain [M54.59]    OBJECTIVE    Therapeutic Procedures:  Tx Min Billable or 1:1 Min (if diff from Tx Min) Procedure, Rationale, Specifics   30  03009 Therapeutic Exercise (timed):  increase ROM, strength, coordination, balance, and proprioception to improve patient's ability to progress to PLOF and address remaining functional goals. (see flow sheet as applicable)    Details if applicable:       15  36923 Neuromuscular Re-Education (timed):  improve balance, coordination, kinesthetic sense, posture, core stability and proprioception to improve patient's ability to develop conscious control of individual muscles and awareness of position of extremities in order to progress to PLOF and address remaining functional goals. (see flow sheet as applicable)    Details if applicable:     20  91241 Therapeutic Activity (timed):  use of dynamic activities replicating functional movements to increase ROM, strength, coordination, balance, and proprioception in order to improve patient's ability to progress to PLOF and address remaining functional goals.  (see flow sheet as applicable)     Details if applicable:     65  [facility] Totals Reminder: bill using total billable min of TIMED therapeutic procedures (example: do not include dry needle or estim unattended, both untimed codes, in totals to left)  8-22 min = 1 unit; 23-37 min = 2 units; 38-52 min = 3 units; 53-67 min = 4 units; 68-82 min = 5 units   Total Total     TOTAL TREATMENT

## 2024-05-15 ENCOUNTER — HOSPITAL ENCOUNTER (OUTPATIENT)
Facility: HOSPITAL | Age: 49
Setting detail: RECURRING SERIES
Discharge: HOME | End: 2024-05-18
Payer: COMMERCIAL

## 2024-05-15 PROCEDURE — 97530 THERAPEUTIC ACTIVITIES: CPT

## 2024-05-15 PROCEDURE — 97112 NEUROMUSCULAR REEDUCATION: CPT

## 2024-05-15 PROCEDURE — 97110 THERAPEUTIC EXERCISES: CPT

## 2024-05-15 NOTE — PROGRESS NOTES
PHYSICAL / OCCUPATIONAL THERAPY - DAILY TREATMENT NOTE     Patient Name: Magdalena Arriola    Date: 5/15/2024    : 1975  Insurance: Payor: JORGE / Plan: [facility] [place] [place] / Product Type: *No Product type* /      Patient  verified Yes     Visit #   Current / Total 12 16   Time   In / Out 1410 1510   Pain   In / Out 0 0   Subjective Functional Status/Changes: \"I was sore after last visit, but I am doing well today.\"   Changes to:  Allergies, Med Hx, Sx Hx?   no       TREATMENT AREA =  Other low back pain [M54.59]    OBJECTIVE    Therapeutic Procedures:  Tx Min Billable or 1:1 Min (if diff from Tx Min) Procedure, Rationale, Specifics   35  38588 Therapeutic Exercise (timed):  increase ROM, strength, coordination, balance, and proprioception to improve patient's ability to progress to PLOF and address remaining functional goals. (see flow sheet as applicable)    Details if applicable:       15  97258 Neuromuscular Re-Education (timed):  improve balance, coordination, kinesthetic sense, posture, core stability and proprioception to improve patient's ability to develop conscious control of individual muscles and awareness of position of extremities in order to progress to PLOF and address remaining functional goals. (see flow sheet as applicable)    Details if applicable:     15  13472 Therapeutic Activity (timed):  use of dynamic activities replicating functional movements to increase ROM, strength, coordination, balance, and proprioception in order to improve patient's ability to progress to PLOF and address remaining functional goals.  (see flow sheet as applicable)     Details if applicable:     60  [facility] Totals Reminder: bill using total billable min of TIMED therapeutic procedures (example: do not include dry needle or estim unattended, both untimed codes, in totals to left)  8-22 min = 1 unit; 23-37 min = 2 units; 38-52 min = 3 units; 53-67 min = 4 units; 68-82 min = 5 units   Total Total     TOTAL TREATMENT TIME:

## 2024-05-20 ENCOUNTER — HOSPITAL ENCOUNTER (OUTPATIENT)
Facility: HOSPITAL | Age: 49
Setting detail: RECURRING SERIES
Discharge: HOME | End: 2024-05-23
Payer: COMMERCIAL

## 2024-05-20 PROCEDURE — 97112 NEUROMUSCULAR REEDUCATION: CPT

## 2024-05-20 PROCEDURE — 97530 THERAPEUTIC ACTIVITIES: CPT

## 2024-05-20 PROCEDURE — 97110 THERAPEUTIC EXERCISES: CPT

## 2024-05-22 ENCOUNTER — HOSPITAL ENCOUNTER (OUTPATIENT)
Facility: HOSPITAL | Age: 49
Setting detail: RECURRING SERIES
Discharge: HOME | End: 2024-05-25
Payer: COMMERCIAL

## 2024-05-22 PROCEDURE — 97112 NEUROMUSCULAR REEDUCATION: CPT

## 2024-05-22 PROCEDURE — 97530 THERAPEUTIC ACTIVITIES: CPT

## 2024-05-22 PROCEDURE — 97110 THERAPEUTIC EXERCISES: CPT

## 2024-05-22 NOTE — PROGRESS NOTES
[x]  Patient Education billed concurrently with other procedures   [x] Review HEP    [] Progressed/Changed HEP, detail:    [] Other detail:       Objective Information/Functional Measures/Assessment  Advanced exercise by introducing cable machine in the gym. She required cues for mechanics  and . Will continue to advance exercise intensity in future visits.     Patient will continue to benefit from skilled PT services to modify and progress therapeutic interventions, analyze and address functional mobility deficits, analyze and address ROM deficits, analyze and address strength deficits, analyze and address soft tissue restrictions, analyze and cue for proper movement patterns, analyze and modify for postural abnormalities, analyze and address imbalance/dizziness, and instruct in home and community integration to address functional deficits and attain remaining goals.    Progress toward goals / Updated goals:  []  See Progress Note/Recertification    Short Term Goals: To be accomplished in 4 weeks:                 Patient will report compliance with HEP 1x/day to aid in rehabilitation program.                 Status at IE: provided initial HEP                 Current: Met, 4/24/2024     Long Term Goals: To be accomplished in  8 weeks:                    Patient will report compliance with HEP a least 3-4x/week to aid in rehabilitation/strengthening program.                 Status at IE: NA                 Current: In-progress, advanced HEP, 4/29/2024                    Patient will increase bilateral LE strength to 5/5 MMT throughout to aid in completion of ADLs.                 Status at IE:4/5                 Current: In-progress, 4+/5, 5/6/2024                    Patient will report pain no greater than 0-1/10 throughout entire day to aid in completion of ADLs.                 Status at IE:0-7/10                 Current:In-progress, 0-2/10, 5/6/2024                    Patent will be able to lift

## 2024-05-29 ENCOUNTER — HOSPITAL ENCOUNTER (OUTPATIENT)
Facility: HOSPITAL | Age: 49
Setting detail: RECURRING SERIES
Discharge: HOME | End: 2024-06-01
Payer: COMMERCIAL

## 2024-05-29 PROCEDURE — 97110 THERAPEUTIC EXERCISES: CPT

## 2024-05-29 PROCEDURE — 97112 NEUROMUSCULAR REEDUCATION: CPT

## 2024-05-29 PROCEDURE — 97530 THERAPEUTIC ACTIVITIES: CPT

## 2024-05-29 NOTE — PROGRESS NOTES
[x]  Patient Education billed concurrently with other procedures   [x] Review HEP    [] Progressed/Changed HEP, detail:    [] Other detail:       Objective Information/Functional Measures/Assessment  Patient required fewer cues for cable  and mechanics. She was challenged with anti rotation exercise. Advanced exercise via resistance and she was challenged with exercise prescribed     Patient will continue to benefit from skilled PT services to modify and progress therapeutic interventions, analyze and address functional mobility deficits, analyze and address ROM deficits, analyze and address strength deficits, analyze and address soft tissue restrictions, analyze and cue for proper movement patterns, analyze and modify for postural abnormalities, analyze and address imbalance/dizziness, and instruct in home and community integration to address functional deficits and attain remaining goals.    Progress toward goals / Updated goals:  []  See Progress Note/Recertification    Short Term Goals: To be accomplished in 4 weeks:                 Patient will report compliance with HEP 1x/day to aid in rehabilitation program.                 Status at IE: provided initial HEP                 Current: Met, 4/24/2024     Long Term Goals: To be accomplished in  8 weeks:                    Patient will report compliance with HEP a least 3-4x/week to aid in rehabilitation/strengthening program.                 Status at IE: NA                 Current: In-progress, advanced HEP, 4/29/2024                    Patient will increase bilateral LE strength to 5/5 MMT throughout to aid in completion of ADLs.                 Status at IE:4/5                 Current: In-progress, 4+/5, 5/6/2024                    Patient will report pain no greater than 0-1/10 throughout entire day to aid in completion of ADLs.                 Status at IE:0-7/10                 Current:Met, 0-2/10, 5/29/2024                    Patent will

## 2024-06-05 ENCOUNTER — HOSPITAL ENCOUNTER (OUTPATIENT)
Facility: HOSPITAL | Age: 49
Setting detail: RECURRING SERIES
Discharge: HOME | End: 2024-06-08
Payer: COMMERCIAL

## 2024-06-05 PROCEDURE — 97530 THERAPEUTIC ACTIVITIES: CPT

## 2024-06-05 PROCEDURE — 97110 THERAPEUTIC EXERCISES: CPT

## 2024-06-05 PROCEDURE — 97112 NEUROMUSCULAR REEDUCATION: CPT

## 2024-06-05 NOTE — PROGRESS NOTES
[facility] at [facility]  [address] [address]  Phone: [phone]   Fax: [phone]    Discharge Summary    Patient name: Magdalena Arriola     Start of Care: 4/3/2024   Referral source: Ora Lutz PA    : 1975  Medical/Treatment Diagnosis: Other low back pain [M54.59]  Onset Date:4/3/2021   Prior Hospitalization: see medical history   Provider#: [id number]  Medications: Verified on Patient Summary List    Comorbidities: Thyroid, anxiety/depression   Prior Level of Function:Aerobic exercise, Ballet     Visits from Start of Care: 16       Goals/Measure of Progress:  Short Term Goals: To be accomplished in 4 weeks:                 Patient will report compliance with HEP 1x/day to aid in rehabilitation program.                 Status at IE: provided initial HEP                 Current: Met, 2024     Long Term Goals: To be accomplished in  8 weeks:                    Patient will report compliance with HEP a least 3-4x/week to aid in rehabilitation/strengthening program.                 Status at IE: NA                 Current: Met, 2024                    Patient will increase bilateral LE strength to 5/5 MMT throughout to aid in completion of ADLs.                 Status at IE:                 Current: Partially Met, 4+/5, 2024                    Patient will report pain no greater than 0-1/10 throughout entire day to aid in completion of ADLs.                 Status at IE:0-7/10                 Current:Met, 0-2/10, 2024                    Patent will be able to lift heavy object from the floor without exacerbation of symptoms to promote tolerance to ADLs.                 Status at IE:7/10                 Current:Met, 20# KB off of the floor without pain 2024                    Patient will improve FOTO score to 67 points to demonstrate improvement in functional status.                 Status at IE:FOTO score = 57 (an established functional score

## 2024-06-05 NOTE — PROGRESS NOTES
PHYSICAL / OCCUPATIONAL THERAPY - DAILY TREATMENT NOTE     Patient Name: Magdalena Arriola    Date: 2024    : 1975  Insurance: Payor: JORGE / Plan: [facility] [place] / Product Type: *No Product type* /      Patient  verified Yes     Visit #   Current / Total 16 16   Time   In / Out 1413 1510   Pain   In / Out 0 0   Subjective Functional Status/Changes: \"I'm feeling better than out initial visit. I feel stronger overall.\"   Changes to:  Allergies, Med Hx, Sx Hx?   no       TREATMENT AREA =  Other low back pain [M54.59]    OBJECTIVE    Therapeutic Procedures:  Tx Min Billable or 1:1 Min (if diff from Tx Min) Procedure, Rationale, Specifics   30 20 97007 Therapeutic Exercise (timed):  increase ROM, strength, coordination, balance, and proprioception to improve patient's ability to progress to PLOF and address remaining functional goals. (see flow sheet as applicable)    Details if applicable:       8 8 16881 Neuromuscular Re-Education (timed):  improve balance, coordination, kinesthetic sense, posture, core stability and proprioception to improve patient's ability to develop conscious control of individual muscles and awareness of position of extremities in order to progress to PLOF and address remaining functional goals. (see flow sheet as applicable)    Details if applicable:     19  49834 Therapeutic Activity (timed):  use of dynamic activities replicating functional movements to increase ROM, strength, coordination, balance, and proprioception in order to improve patient's ability to progress to PLOF and address remaining functional goals.  (see flow sheet as applicable)     Details if applicable:     57 40 [facility] Totals Reminder: bill using total billable min of TIMED therapeutic procedures (example: do not include dry needle or estim unattended, both untimed codes, in totals to left)  8-22 min = 1 unit; 23-37 min = 2 units; 38-52 min = 3 units; 53-67 min = 4 units; 68-82 min = 5 units   Total Total     TOTAL

## 2024-06-11 ENCOUNTER — APPOINTMENT (OUTPATIENT)
Facility: HOSPITAL | Age: 49
End: 2024-06-11
Payer: COMMERCIAL

## 2024-06-18 ENCOUNTER — APPOINTMENT (OUTPATIENT)
Facility: HOSPITAL | Age: 49
End: 2024-06-18
Payer: COMMERCIAL

## 2024-06-20 ENCOUNTER — APPOINTMENT (OUTPATIENT)
Facility: HOSPITAL | Age: 49
End: 2024-06-20
Payer: COMMERCIAL

## 2024-11-15 ENCOUNTER — HOSPITAL ENCOUNTER (OUTPATIENT)
Facility: HOSPITAL | Age: 49
Discharge: HOME | End: 2024-11-17
Attending: INTERNAL MEDICINE
Payer: COMMERCIAL

## 2024-11-15 DIAGNOSIS — R07.9 CHEST PAIN, UNSPECIFIED TYPE: ICD-10-CM

## 2024-11-15 LAB
EKG DIAGNOSIS: NORMAL
STRESS ANGINA INDEX: 0
STRESS BASELINE DIAS BP: 81 MMHG
STRESS BASELINE HR: 89 BPM
STRESS BASELINE ST DEPRESSION: 0 MM
STRESS BASELINE SYS BP: 129 MMHG
STRESS ESTIMATED WORKLOAD: 11.7 METS
STRESS PEAK DIAS BP: 85 MMHG
STRESS PEAK SYS BP: 165 MMHG
STRESS PERCENT HR ACHIEVED: 96 %
STRESS POST PEAK HR: 166 BPM
STRESS RATE PRESSURE PRODUCT: NORMAL BPM*MMHG
STRESS ST DEPRESSION: 0 MM
STRESS TARGET HR: 173 BPM

## 2024-11-15 PROCEDURE — 93016 CV STRESS TEST SUPVJ ONLY: CPT | Performed by: INTERNAL MEDICINE

## 2024-11-15 PROCEDURE — 93017 CV STRESS TEST TRACING ONLY: CPT

## 2024-11-15 PROCEDURE — 93018 CV STRESS TEST I&R ONLY: CPT | Performed by: INTERNAL MEDICINE

## 2025-02-14 ENCOUNTER — TELEPHONE (OUTPATIENT)
Facility: HOSPITAL | Age: 50
End: 2025-02-14

## 2025-04-11 ENCOUNTER — TELEPHONE (OUTPATIENT)
Facility: HOSPITAL | Age: 50
End: 2025-04-11

## 2025-04-11 NOTE — TELEPHONE ENCOUNTER
Confirmed arrival time @ 2:20 for 4/14 eval. Informed pt we do reminder calls a few days before eval to confirm reg & her online check is valid.

## 2025-04-14 ENCOUNTER — HOSPITAL ENCOUNTER (OUTPATIENT)
Facility: HOSPITAL | Age: 50
Setting detail: RECURRING SERIES
Discharge: HOME | End: 2025-04-17
Payer: COMMERCIAL

## 2025-04-14 PROCEDURE — 97161 PT EVAL LOW COMPLEX 20 MIN: CPT

## 2025-04-14 PROCEDURE — 97110 THERAPEUTIC EXERCISES: CPT

## 2025-04-14 NOTE — PROGRESS NOTES
[facility] at [facility]  [address] [address]  Phone: [phone]   Fax: [phone]    Plan of Care/ Statement of Necessity for Physical Therapy Services    Patient name: Magdalena Arriola Start of Care: 2025   Referral source: Tashia Haynes MD : 1975    Medical Diagnosis: Low back pain, unspecified  Other low back pain Onset Date:2025    Treatment Diagnosis:  M54.59  OTHER LOWER BACK PAIN                                          Prior Hospitalization: see medical history Provider#: [id number]     Comorbidities: thyroid, anxiety/depression, hx of LBP     Prior Level of Function:  Aerobic exercise, Ballet       The Plan of Care and following information is based on the information from the initial evaluation.  Assessment/ wright information: Magdalena Arriola is a 49 yo female that presents with c/o low back pain since 2025 when traveling she had to carrying luggage and place it in overhead bin. She demonstrates reduced postural strength. She has decreased bilateral LE strength and reduced abdominal strength. She was negative during SIJ cluster testing. She demonstrates increased pain with repeated flexion but  was negative during SLR and slump test. Patient presentation is consistent with non specific low back pain. Patient will  benefit from skilled PT services to modify and progress therapeutic interventions, address functional mobility deficits, address ROM deficits, address strength deficits, analyze and address soft tissue restrictions, analyze and cue movement patterns, analyze and modify body mechanics/ergonomics and assess and modify postural abnormalities to attain her goals.     Evaluation Complexity:  History:  MEDIUM  Complexity : 1-2 comorbidities / personal factors will impact the outcome/ POC ; Examination:  LOW Complexity : 1-2 Standardized tests and measures addressing body structure, function, activity limitation and / or participation in recreation

## 2025-04-14 NOTE — PROGRESS NOTES
PT DAILY TREATMENT NOTE/LUMBAR EVAL 10-18    Patient Name: Magdalena Arriola  Date:2025  : 1975  [x]  Patient  Verified  Payor: VINNIE PATEL / Plan: [facility] [place] / Product Type: *No Product type* /    In time:1450  Out time:1530  Total Treatment Time (min): 40  Visit #: 1 of 16    Medicare/BCBS Only   Total Timed Codes (min):  10 1:1 Treatment Time:  40     Treatment Area: Low back pain, unspecified [M54.50]  SUBJECTIVE  Pain Level (0-10 scale): 1  []constant []intermittent []improving []worsening []no change since onset    Any medication changes, allergies to medications, adverse drug reactions, diagnosis change, or new procedure performed?: [x] No    [] Yes (see summary sheet for update)  Subjective functional status/changes:     Patient presents with c/o low back pain since 2025 when traveling and carrying luggage and placing in overhead bins. Patient describes pain as ache. Denies numbness/tingling. Denies popping/clicking. Aggravating factors:lifting, bending, prolonged sitting. Alleviating factors: positioning.  Denies red flags: SOB, chest pain, dizziness/lightheadedness, blurred/double vision, HA, chills/fevers, night sweats, change in bowel/bladder control, abdominal pain, difficulty swallowing, slurred speech, unexplained weight gain/loss, nausea, vomiting. PMHx: Thyroid, anxiety/depression. Surgical Hx: Skin CA re. Social Hx:  lives spouse in a two story home, work status: Professor. PLOF: Aerobic exercise, Ballet.       OBJECTIVE/EXAMINATION    30 min [x]Eval                  []Re-Eval     10 min Therapeutic Exercise:  [x] See flow sheet :   Rationale: increase ROM, increase strength and decrease pain to improve the patient’s ability to complete ADLs          With   [x] TE   [] TA   [] neuro   [] other: Patient Education: [x] Review HEP    [] Progressed/Changed HEP based on:   [] positioning   [] body mechanics   [] transfers   [] heat/ice application    [] other:      Physical Therapy

## 2025-04-23 ENCOUNTER — HOSPITAL ENCOUNTER (OUTPATIENT)
Facility: HOSPITAL | Age: 50
Setting detail: RECURRING SERIES
Discharge: HOME | End: 2025-04-26
Payer: COMMERCIAL

## 2025-04-23 PROCEDURE — 97110 THERAPEUTIC EXERCISES: CPT

## 2025-04-23 PROCEDURE — 97112 NEUROMUSCULAR REEDUCATION: CPT

## 2025-04-23 PROCEDURE — 97530 THERAPEUTIC ACTIVITIES: CPT

## 2025-04-23 NOTE — PROGRESS NOTES
PHYSICAL / OCCUPATIONAL THERAPY - DAILY TREATMENT NOTE     Patient Name: Magdalena Arriola    Date: 2025    : 1975  Insurance: Payor: VINNIE PATEL / Plan: [facility] / Product Type: *No Product type* /      Patient  verified Yes     Visit #   Current / Total 2 16   Time   In / Out 1010 1050   Pain   In / Out 1 0   Subjective Functional Status/Changes: \"I was sore after the initial visit.\"   Changes to:  Allergies, Med Hx, Sx Hx?   no       TREATMENT AREA =  Low back pain, unspecified [M54.50]  Other low back pain [M54.59]    If an interpreting service is utilized for treatment of this patient, the contents of this document represent the material reviewed with the patient via the .     OBJECTIVE    Therapeutic Procedures:  Tx Min Billable or 1:1 Min (if diff from Tx Min) Procedure, Rationale, Specifics   20  79013 Therapeutic Exercise (timed):  increase ROM, strength, coordination, balance, and proprioception to improve patient's ability to progress to PLOF and address remaining functional goals. (see flow sheet as applicable)    Details if applicable:       8  42972 Neuromuscular Re-Education (timed):  improve balance, coordination, kinesthetic sense, posture, core stability and proprioception to improve patient's ability to develop conscious control of individual muscles and awareness of position of extremities in order to progress to PLOF and address remaining functional goals. (see flow sheet as applicable)    Details if applicable:     12  63744 Therapeutic Activity (timed):  use of dynamic activities replicating functional movements to increase ROM, strength, coordination, balance, and proprioception in order to improve patient's ability to progress to PLOF and address remaining functional goals.  (see flow sheet as applicable)     Details if applicable:           Details if applicable:            Details if applicable:     40  [facility] Totals Reminder: bill using total billable min of TIMED 
therapeutic procedures (example: do not include dry needle or estim unattended, both untimed codes, in totals to left)  8-22 min = 1 unit; 23-37 min = 2 units; 38-52 min = 3 units; 53-67 min = 4 units; 68-82 min = 5 units   Total Total     TOTAL TREATMENT TIME:        40     Charge Capture    [x]  Patient Education billed concurrently with other procedures   [x] Review HEP    [] Progressed/Changed HEP, detail:    [] Other detail:       Objective Information/Functional Measures/Assessment    Patient required cues for activity pacing to promote muscle recovery time. She responded well to flexion based activities reporting a decrease in symptoms. Reviewed HEP to promote good carryover at home.     Patient will continue to benefit from skilled PT services to modify and progress therapeutic interventions, analyze and address functional mobility deficits, analyze and address ROM deficits, analyze and address strength deficits, analyze and address soft tissue restrictions, analyze and cue for proper movement patterns, analyze and modify for postural abnormalities, analyze and address imbalance/dizziness, and instruct in home and community integration to address functional deficits and attain remaining goals.    Progress toward goals / Updated goals:  []  See Progress Note/Recertification    Short Term Goals: To be accomplished in 8 visits:                 Patient will report compliance with HEP 1x/day to aid in rehabilitation program.                 Status at IE: Provided initial HEP                 Current:In-progress, reviewed HEP, 4/23/2025                    Patient will demonstrate pain free lumbar AROM in flexion and extension to aid in completion of ADLs.                 Status at IE:4/10                 Current:Same as IE     Long Term Goals: To be accomplished in 16 visits:                    Patient will report compliance with HEP a least 3-4x/week to aid in rehabilitation/strengthening program.

## 2025-04-28 ENCOUNTER — HOSPITAL ENCOUNTER (OUTPATIENT)
Facility: HOSPITAL | Age: 50
Setting detail: RECURRING SERIES
Discharge: HOME | End: 2025-05-01
Payer: COMMERCIAL

## 2025-04-28 PROCEDURE — 97110 THERAPEUTIC EXERCISES: CPT

## 2025-04-28 PROCEDURE — 97530 THERAPEUTIC ACTIVITIES: CPT

## 2025-04-28 PROCEDURE — 97112 NEUROMUSCULAR REEDUCATION: CPT

## 2025-04-28 NOTE — PROGRESS NOTES
PHYSICAL / OCCUPATIONAL THERAPY - DAILY TREATMENT NOTE     Patient Name: Magdalena Arriola    Date: 2025    : 1975  Insurance: Payor: VINNIE PATEL / Plan: [facility] [place] / Product Type: *No Product type* /      Patient  verified Yes     Visit #   Current / Total 3 16   Time   In / Out 1010 1050   Pain   In / Out 0 0   Subjective Functional Status/Changes: \"I am feeling better than last week.\"   Changes to:  Allergies, Med Hx, Sx Hx?   no       TREATMENT AREA =  Low back pain, unspecified [M54.50]  Other low back pain [M54.59]    If an interpreting service is utilized for treatment of this patient, the contents of this document represent the material reviewed with the patient via the .     OBJECTIVE    Therapeutic Procedures:  Tx Min Billable or 1:1 Min (if diff from Tx Min) Procedure, Rationale, Specifics   20  26391 Therapeutic Exercise (timed):  increase ROM, strength, coordination, balance, and proprioception to improve patient's ability to progress to PLOF and address remaining functional goals. (see flow sheet as applicable)    Details if applicable:       12  24065 Neuromuscular Re-Education (timed):  improve balance, coordination, kinesthetic sense, posture, core stability and proprioception to improve patient's ability to develop conscious control of individual muscles and awareness of position of extremities in order to progress to PLOF and address remaining functional goals. (see flow sheet as applicable)    Details if applicable:     8  99412 Therapeutic Activity (timed):  use of dynamic activities replicating functional movements to increase ROM, strength, coordination, balance, and proprioception in order to improve patient's ability to progress to PLOF and address remaining functional goals.  (see flow sheet as applicable)     Details if applicable:           Details if applicable:            Details if applicable:     40  [facility] Totals Reminder: bill using total billable min of TIMED

## 2025-05-01 ENCOUNTER — HOSPITAL ENCOUNTER (OUTPATIENT)
Facility: HOSPITAL | Age: 50
Setting detail: RECURRING SERIES
Discharge: HOME | End: 2025-05-04
Payer: COMMERCIAL

## 2025-05-01 PROCEDURE — 97110 THERAPEUTIC EXERCISES: CPT

## 2025-05-01 PROCEDURE — 97530 THERAPEUTIC ACTIVITIES: CPT

## 2025-05-01 PROCEDURE — 97112 NEUROMUSCULAR REEDUCATION: CPT

## 2025-05-01 NOTE — PROGRESS NOTES
Status at IE: NA                 Current:Same as IE                    Patient will increase bilateral LE strength to 5/5 MMT throughout to aid in completion of ADLs.                 Status at IE:4/5                 Current:Same as IE                    Patient will report pain no greater than 0-1/10/10 throughout entire day to aid in completion of ADLs.                 Status at IE:0-6/10                 Current:In-progress, 0-3/10, 5/1/2025                    Patent will be able to sit for 60 mins to be able to perform work place activities and drive to appointments and complete ADLs.                 Status at IE:Paint                 Current:Same as IE                    Patient will improve CHIQUITA score by 13 points to demonstrate improvement in functional status.                 Status at IE:CHIQUITA score = 20 (an established functional score where 100 = total disability)                 Current:Same as IE     PLAN  Yes  Continue plan of care  []  Upgrade activities as tolerated  []  Discharge due to :  []  Other:    Brown Calvo PT    5/1/2025    9:47 AM    Future Appointments   Date Time Provider Department Center   5/5/2025  9:30 AM Nacho Sanchez, ROSY [facility]   5/8/2025  9:30 AM Brown Calvo, PT [facility]   5/13/2025  9:30 AM Brown Calvo, PT [facility]

## 2025-05-05 ENCOUNTER — HOSPITAL ENCOUNTER (OUTPATIENT)
Facility: HOSPITAL | Age: 50
Setting detail: RECURRING SERIES
Discharge: HOME | End: 2025-05-08
Payer: COMMERCIAL

## 2025-05-05 PROCEDURE — 97112 NEUROMUSCULAR REEDUCATION: CPT

## 2025-05-05 PROCEDURE — 97530 THERAPEUTIC ACTIVITIES: CPT

## 2025-05-05 PROCEDURE — 97110 THERAPEUTIC EXERCISES: CPT

## 2025-05-05 NOTE — PROGRESS NOTES
free lumbar AROM in flexion and extension to aid in completion of ADLs.                 Status at IE:4/10                 Current:IN-progress, 0-2/10, 4/28/2025     Long Term Goals: To be accomplished in 16 visits:                    Patient will report compliance with HEP a least 3-4x/week to aid in rehabilitation/strengthening program.                 Status at IE: NA                 Current:Same as IE                    Patient will increase bilateral LE strength to 5/5 MMT throughout to aid in completion of ADLs.                 Status at IE:4/5                 Current:Same as IE                    Patient will report pain no greater than 0-1/10/10 throughout entire day to aid in completion of ADLs.                 Status at IE:0-6/10                 Current:In-progress, 0-3/10, 5/1/2025                    Patent will be able to sit for 60 mins to be able to perform work place activities and drive to appointments and complete ADLs.                 Status at IE:Paint                 Current:Same as IE                    Patient will improve CHIQUITA score by 13 points to demonstrate improvement in functional status.                 Status at IE:CHIQUITA score = 20 (an established functional score where 100 = total disability)                 Current: CHIQUITA 16% 5/5/25    PLAN  Yes  Continue plan of care  []  Upgrade activities as tolerated  []  Discharge due to :  []  Other:    Nacho Sanchez PTA    5/5/2025    8:28 AM    Future Appointments   Date Time Provider Department Center   5/5/2025  9:30 AM Nacho Sanchez PTA [facility] [place]   5/8/2025  9:30 AM Brown Calvo, PT [facility] [place]   5/13/2025  9:30 AM Brown Calvo, PT [facility] [place]

## 2025-05-08 ENCOUNTER — HOSPITAL ENCOUNTER (OUTPATIENT)
Facility: HOSPITAL | Age: 50
Setting detail: RECURRING SERIES
Discharge: HOME | End: 2025-05-11
Payer: COMMERCIAL

## 2025-05-08 PROCEDURE — 97530 THERAPEUTIC ACTIVITIES: CPT

## 2025-05-08 PROCEDURE — 97110 THERAPEUTIC EXERCISES: CPT

## 2025-05-08 PROCEDURE — 97112 NEUROMUSCULAR REEDUCATION: CPT

## 2025-05-08 NOTE — PROGRESS NOTES
PHYSICAL / OCCUPATIONAL THERAPY - DAILY TREATMENT NOTE     Patient Name: Magdalena Arriola    Date: 2025    : 1975  Insurance: Payor: VINNIE PATEL / Plan: [facility] [place] / Product Type: *No Product type* /      Patient  verified Yes     Visit #   Current / Total 6 16   Time   In / Out 0930 1030   Pain   In / Out 0 0   Subjective Functional Status/Changes: \"I am feeling better today.\"   Changes to:  Allergies, Med Hx, Sx Hx?   no       TREATMENT AREA =  Low back pain, unspecified [M54.50]  Other low back pain [M54.59]    If an interpreting service is utilized for treatment of this patient, the contents of this document represent the material reviewed with the patient via the .     OBJECTIVE    Therapeutic Procedures:  Tx Min Billable or 1:1 Min (if diff from Tx Min) Procedure, Rationale, Specifics   25  32226 Therapeutic Exercise (timed):  increase ROM, strength, coordination, balance, and proprioception to improve patient's ability to progress to PLOF and address remaining functional goals. (see flow sheet as applicable)    Details if applicable:       12  41838 Neuromuscular Re-Education (timed):  improve balance, coordination, kinesthetic sense, posture, core stability and proprioception to improve patient's ability to develop conscious control of individual muscles and awareness of position of extremities in order to progress to PLOF and address remaining functional goals. (see flow sheet as applicable)    Details if applicable:     23  50440 Therapeutic Activity (timed):  use of dynamic activities replicating functional movements to increase ROM, strength, coordination, balance, and proprioception in order to improve patient's ability to progress to PLOF and address remaining functional goals.  (see flow sheet as applicable)     Details if applicable:     60  [facility] Totals Reminder: bill using total billable min of TIMED therapeutic procedures (example: do not include dry needle or estim unattended,

## 2025-05-13 ENCOUNTER — HOSPITAL ENCOUNTER (OUTPATIENT)
Facility: HOSPITAL | Age: 50
Setting detail: RECURRING SERIES
Discharge: HOME | End: 2025-05-16
Payer: COMMERCIAL

## 2025-05-13 PROCEDURE — 97112 NEUROMUSCULAR REEDUCATION: CPT

## 2025-05-13 PROCEDURE — 97530 THERAPEUTIC ACTIVITIES: CPT

## 2025-05-13 PROCEDURE — 97110 THERAPEUTIC EXERCISES: CPT

## 2025-05-13 NOTE — PROGRESS NOTES
[facility]  [address] [address]                                                                                      Phone: [phone]   Fax: [phone]    Progress Note  Patient name: Magdalena Arriola Start of Care: 2025   Referral source: Tashia Haynes MD : 1975   Medical/Treatment Diagnosis: Low back pain, unspecified  Other low back pain Onset Date:2025     Prior Hospitalization: see medical history Provider#: [id number]   Comorbidities: thyroid, anxiety/depression, hx of LBP   Prior Level of Function:Aerobic exercise, Ballet     Reporting Period: 2025 -2025    Visits from Start of Care: 7    Missed Visits: 0    Updated Goals/Measure of Progress:     Patient is improving as she has met 2/2 STGs and is progressing toward her remaining goals. She is compliant with initial HEP. She is demonstrates improved spine mechanics when bending and lifting. She is developing strength and functional mobility but is still limited. Will continue to focus on strength and will introduce machines via the local gym in future visits.      Patient will continue to benefit from skilled PT services to modify and progress therapeutic interventions, analyze and address functional mobility deficits, analyze and address ROM deficits, analyze and address strength deficits, analyze and address soft tissue restrictions, analyze and cue for proper movement patterns, analyze and modify for postural abnormalities, analyze and address imbalance/dizziness, and instruct in home and community integration to address functional deficits and attain remaining goals.    Summary of Care/ Key Functional Changes:   Short Term Goals: To be accomplished in 8 visits:                 Patient will report compliance with HEP 1x/day to aid in rehabilitation program.                 Status at IE: Provided initial HEP                 Current:Met, 2025                    Patient will

## 2025-05-13 NOTE — PROGRESS NOTES
PHYSICAL / OCCUPATIONAL THERAPY - DAILY TREATMENT NOTE     Patient Name: Magdalena Arriola    Date: 2025    : 1975  Insurance: Payor: VINNIE PATEL / Plan: [facility] [place] / Product Type: *No Product type* /      Patient  verified Yes     Visit #   Current / Total 7 16   Time   In / Out 0930 1026   Pain   In / Out 0 0   Subjective Functional Status/Changes: \"I am doing well and have no new complaints. I would like to try the able machine at the gym next visit.\"   Changes to:  Allergies, Med Hx, Sx Hx?   no       TREATMENT AREA =  Low back pain, unspecified [M54.50]  Other low back pain [M54.59]    If an interpreting service is utilized for treatment of this patient, the contents of this document represent the material reviewed with the patient via the .     OBJECTIVE    Therapeutic Procedures:  Tx Min Billable or 1:1 Min (if diff from Tx Min) Procedure, Rationale, Specifics   25  39038 Therapeutic Exercise (timed):  increase ROM, strength, coordination, balance, and proprioception to improve patient's ability to progress to PLOF and address remaining functional goals. (see flow sheet as applicable)    Details if applicable:       12  96420 Neuromuscular Re-Education (timed):  improve balance, coordination, kinesthetic sense, posture, core stability and proprioception to improve patient's ability to develop conscious control of individual muscles and awareness of position of extremities in order to progress to PLOF and address remaining functional goals. (see flow sheet as applicable)    Details if applicable:     19  51923 Therapeutic Activity (timed):  use of dynamic activities replicating functional movements to increase ROM, strength, coordination, balance, and proprioception in order to improve patient's ability to progress to PLOF and address remaining functional goals.  (see flow sheet as applicable)     Details if applicable:     56  [facility] Totals Reminder: bill using total billable min of TIMED

## 2025-05-20 ENCOUNTER — HOSPITAL ENCOUNTER (OUTPATIENT)
Facility: HOSPITAL | Age: 50
Setting detail: RECURRING SERIES
Discharge: HOME | End: 2025-05-23
Payer: COMMERCIAL

## 2025-05-20 PROCEDURE — 97110 THERAPEUTIC EXERCISES: CPT

## 2025-05-20 PROCEDURE — 97530 THERAPEUTIC ACTIVITIES: CPT

## 2025-05-20 PROCEDURE — 97112 NEUROMUSCULAR REEDUCATION: CPT

## 2025-05-20 NOTE — PROGRESS NOTES
PHYSICAL / OCCUPATIONAL THERAPY - DAILY TREATMENT NOTE     Patient Name: Magdalena Arriola    Date: 2025    : 1975  Insurance: Payor: VINNIE PATEL / Plan: [facility] [place] / Product Type: *No Product type* /      Patient  verified Yes     Visit #   Current / Total 8 16   Time   In / Out 1410 1506   Pain   In / Out 0 0   Subjective Functional Status/Changes: \"I am getting better. I would like to learn how to use th cable machine in the gym.\"   Changes to:  Allergies, Med Hx, Sx Hx?   no       TREATMENT AREA =  Low back pain, unspecified [M54.50]  Other low back pain [M54.59]    If an interpreting service is utilized for treatment of this patient, the contents of this document represent the material reviewed with the patient via the .     OBJECTIVE    Therapeutic Procedures:  Tx Min Billable or 1:1 Min (if diff from Tx Min) Procedure, Rationale, Specifics   30 20 92968 Therapeutic Exercise (timed):  increase ROM, strength, coordination, balance, and proprioception to improve patient's ability to progress to PLOF and address remaining functional goals. (see flow sheet as applicable)    Details if applicable:       11 8 59057 Neuromuscular Re-Education (timed):  improve balance, coordination, kinesthetic sense, posture, core stability and proprioception to improve patient's ability to develop conscious control of individual muscles and awareness of position of extremities in order to progress to PLOF and address remaining functional goals. (see flow sheet as applicable)    Details if applicable:     15 12 43236 Therapeutic Activity (timed):  use of dynamic activities replicating functional movements to increase ROM, strength, coordination, balance, and proprioception in order to improve patient's ability to progress to PLOF and address remaining functional goals.  (see flow sheet as applicable)     Details if applicable:     56 40 [facility] Totals Reminder: bill using total billable min of TIMED therapeutic

## 2025-05-22 ENCOUNTER — HOSPITAL ENCOUNTER (OUTPATIENT)
Facility: HOSPITAL | Age: 50
Setting detail: RECURRING SERIES
Discharge: HOME | End: 2025-05-25
Payer: COMMERCIAL

## 2025-05-22 PROCEDURE — 97110 THERAPEUTIC EXERCISES: CPT

## 2025-05-22 PROCEDURE — 97112 NEUROMUSCULAR REEDUCATION: CPT

## 2025-05-22 PROCEDURE — 97530 THERAPEUTIC ACTIVITIES: CPT

## 2025-05-22 NOTE — PROGRESS NOTES
PHYSICAL / OCCUPATIONAL THERAPY - DAILY TREATMENT NOTE     Patient Name: Magdalena Arriola    Date: 2025    : 1975  Insurance: Payor: VINNIE PATEL / Plan: [facility] [place] / Product Type: *No Product type* /      Patient  verified Yes     Visit #   Current / Total 9 16   Time   In / Out 1450 1345   Pain   In / Out 0 0   Subjective Functional Status/Changes: \"I am getting better. I would like to learn how to use th cable machine in the gym.\"   Changes to:  Allergies, Med Hx, Sx Hx?   no       TREATMENT AREA =  Low back pain, unspecified [M54.50]  Other low back pain [M54.59]    If an interpreting service is utilized for treatment of this patient, the contents of this document represent the material reviewed with the patient via the .     OBJECTIVE    Therapeutic Procedures:  Tx Min Billable or 1:1 Min (if diff from Tx Min) Procedure, Rationale, Specifics   25  48240 Therapeutic Exercise (timed):  increase ROM, strength, coordination, balance, and proprioception to improve patient's ability to progress to PLOF and address remaining functional goals. (see flow sheet as applicable)    Details if applicable:       10  09437 Neuromuscular Re-Education (timed):  improve balance, coordination, kinesthetic sense, posture, core stability and proprioception to improve patient's ability to develop conscious control of individual muscles and awareness of position of extremities in order to progress to PLOF and address remaining functional goals. (see flow sheet as applicable)    Details if applicable:     20  53946 Therapeutic Activity (timed):  use of dynamic activities replicating functional movements to increase ROM, strength, coordination, balance, and proprioception in order to improve patient's ability to progress to PLOF and address remaining functional goals.  (see flow sheet as applicable)     Details if applicable:     55  [facility] Totals Reminder: bill using total billable min of TIMED therapeutic procedures

## 2025-05-27 ENCOUNTER — HOSPITAL ENCOUNTER (OUTPATIENT)
Facility: HOSPITAL | Age: 50
Setting detail: RECURRING SERIES
Discharge: HOME | End: 2025-05-30
Payer: COMMERCIAL

## 2025-05-27 ENCOUNTER — TELEPHONE (OUTPATIENT)
Facility: HOSPITAL | Age: 50
End: 2025-05-27

## 2025-05-27 PROCEDURE — 97110 THERAPEUTIC EXERCISES: CPT

## 2025-05-27 PROCEDURE — 97112 NEUROMUSCULAR REEDUCATION: CPT

## 2025-05-27 PROCEDURE — 97530 THERAPEUTIC ACTIVITIES: CPT

## 2025-05-27 NOTE — PROGRESS NOTES
PHYSICAL / OCCUPATIONAL THERAPY - DAILY TREATMENT NOTE     Patient Name: Magdalena Arriola    Date: 2025    : 1975  Insurance: Payor: VINNIE PATEL / Plan: [facility] [place] / Product Type: *No Product type* /      Patient  verified Yes     Visit #   Current / Total 10 16   Time   In / Out 1530 1625   Pain   In / Out 1 0   Subjective Functional Status/Changes: \"I have been doing the exercises in the gym.\"   Changes to:  Allergies, Med Hx, Sx Hx?   no       TREATMENT AREA =  Low back pain, unspecified [M54.50]  Other low back pain [M54.59]    If an interpreting service is utilized for treatment of this patient, the contents of this document represent the material reviewed with the patient via the .     OBJECTIVE    Therapeutic Procedures:  Tx Min Billable or 1:1 Min (if diff from Tx Min) Procedure, Rationale, Specifics   30  91763 Therapeutic Exercise (timed):  increase ROM, strength, coordination, balance, and proprioception to improve patient's ability to progress to PLOF and address remaining functional goals. (see flow sheet as applicable)    Details if applicable:       8  00488 Neuromuscular Re-Education (timed):  improve balance, coordination, kinesthetic sense, posture, core stability and proprioception to improve patient's ability to develop conscious control of individual muscles and awareness of position of extremities in order to progress to PLOF and address remaining functional goals. (see flow sheet as applicable)    Details if applicable:     17  67687 Therapeutic Activity (timed):  use of dynamic activities replicating functional movements to increase ROM, strength, coordination, balance, and proprioception in order to improve patient's ability to progress to PLOF and address remaining functional goals.  (see flow sheet as applicable)     Details if applicable:     55  [facility] Totals Reminder: bill using total billable min of TIMED therapeutic procedures (example: do not include dry needle or

## 2025-05-29 ENCOUNTER — HOSPITAL ENCOUNTER (OUTPATIENT)
Facility: HOSPITAL | Age: 50
Setting detail: RECURRING SERIES
End: 2025-05-29
Payer: COMMERCIAL

## 2025-05-29 PROCEDURE — 97110 THERAPEUTIC EXERCISES: CPT

## 2025-05-29 PROCEDURE — 97530 THERAPEUTIC ACTIVITIES: CPT

## 2025-05-29 PROCEDURE — 97112 NEUROMUSCULAR REEDUCATION: CPT

## 2025-05-29 NOTE — PROGRESS NOTES
PHYSICAL / OCCUPATIONAL THERAPY - DAILY TREATMENT NOTE     Patient Name: Magdalena Arriola    Date: 2025    : 1975  Insurance: Payor: VINNIE PATEL / Plan: [facility] [place] / Product Type: *No Product type* /      Patient  verified Yes     Visit #   Current / Total 11 16   Time   In / Out 1530 1615   Pain   In / Out 1 0   Subjective Functional Status/Changes: \"I have been doing the exercises in the gym.\"   Changes to:  Allergies, Med Hx, Sx Hx?   no       TREATMENT AREA =  Low back pain, unspecified [M54.50]  Other low back pain [M54.59]    If an interpreting service is utilized for treatment of this patient, the contents of this document represent the material reviewed with the patient via the .     OBJECTIVE    Therapeutic Procedures:  Tx Min Billable or 1:1 Min (if diff from Tx Min) Procedure, Rationale, Specifics   25  20166 Therapeutic Exercise (timed):  increase ROM, strength, coordination, balance, and proprioception to improve patient's ability to progress to PLOF and address remaining functional goals. (see flow sheet as applicable)    Details if applicable:       10  05542 Neuromuscular Re-Education (timed):  improve balance, coordination, kinesthetic sense, posture, core stability and proprioception to improve patient's ability to develop conscious control of individual muscles and awareness of position of extremities in order to progress to PLOF and address remaining functional goals. (see flow sheet as applicable)    Details if applicable:     10  28832 Therapeutic Activity (timed):  use of dynamic activities replicating functional movements to increase ROM, strength, coordination, balance, and proprioception in order to improve patient's ability to progress to PLOF and address remaining functional goals.  (see flow sheet as applicable)     Details if applicable:     45  [facility] Totals Reminder: bill using total billable min of TIMED therapeutic procedures (example: do not include dry needle

## 2025-06-02 ENCOUNTER — HOSPITAL ENCOUNTER (OUTPATIENT)
Facility: HOSPITAL | Age: 50
Setting detail: RECURRING SERIES
Discharge: HOME | End: 2025-06-05
Payer: COMMERCIAL

## 2025-06-02 PROCEDURE — 97110 THERAPEUTIC EXERCISES: CPT

## 2025-06-02 PROCEDURE — 97530 THERAPEUTIC ACTIVITIES: CPT

## 2025-06-02 PROCEDURE — 97112 NEUROMUSCULAR REEDUCATION: CPT

## 2025-06-02 NOTE — PROGRESS NOTES
PHYSICAL / OCCUPATIONAL THERAPY - DAILY TREATMENT NOTE     Patient Name: Magdalena Arriola    Date: 2025    : 1975  Insurance: Payor: VINNIE PATEL / Plan: [facility] [place] / Product Type: *No Product type* /      Patient  verified Yes     Visit #   Current / Total 12 16   Time   In / Out 0930 1020   Pain   In / Out 2 0   Subjective Functional Status/Changes: \"I have been doing the exercises in the gym.\"   Changes to:  Allergies, Med Hx, Sx Hx?   no       TREATMENT AREA =  Low back pain, unspecified [M54.50]  Other low back pain [M54.59]    If an interpreting service is utilized for treatment of this patient, the contents of this document represent the material reviewed with the patient via the .     OBJECTIVE    Therapeutic Procedures:  Tx Min Billable or 1:1 Min (if diff from Tx Min) Procedure, Rationale, Specifics   25  43844 Therapeutic Exercise (timed):  increase ROM, strength, coordination, balance, and proprioception to improve patient's ability to progress to PLOF and address remaining functional goals. (see flow sheet as applicable)    Details if applicable:       10  35364 Neuromuscular Re-Education (timed):  improve balance, coordination, kinesthetic sense, posture, core stability and proprioception to improve patient's ability to develop conscious control of individual muscles and awareness of position of extremities in order to progress to PLOF and address remaining functional goals. (see flow sheet as applicable)    Details if applicable:     15  74117 Therapeutic Activity (timed):  use of dynamic activities replicating functional movements to increase ROM, strength, coordination, balance, and proprioception in order to improve patient's ability to progress to PLOF and address remaining functional goals.  (see flow sheet as applicable)     Details if applicable:     50  [facility] Totals Reminder: bill using total billable min of TIMED therapeutic procedures (example: do not include dry needle or

## 2025-06-04 ENCOUNTER — HOSPITAL ENCOUNTER (OUTPATIENT)
Facility: HOSPITAL | Age: 50
Setting detail: RECURRING SERIES
Discharge: HOME | End: 2025-06-07
Payer: COMMERCIAL

## 2025-06-04 PROCEDURE — 97530 THERAPEUTIC ACTIVITIES: CPT

## 2025-06-04 PROCEDURE — 97112 NEUROMUSCULAR REEDUCATION: CPT

## 2025-06-04 PROCEDURE — 97110 THERAPEUTIC EXERCISES: CPT

## 2025-06-04 NOTE — PROGRESS NOTES
PHYSICAL / OCCUPATIONAL THERAPY - DAILY TREATMENT NOTE     Patient Name: Magdalena Arriola    Date: 2025    : 1975  Insurance: Payor: VINNIE PATEL / Plan: [facility] [place] / Product Type: *No Product type* /      Patient  verified Yes     Visit #   Current / Total 13 16   Time   In / Out 0930 1010   Pain   In / Out 2 0   Subjective Functional Status/Changes: \"I have been doing the exercises in the gym.\"   Changes to:  Allergies, Med Hx, Sx Hx?   no       TREATMENT AREA =  Low back pain, unspecified [M54.50]  Other low back pain [M54.59]    If an interpreting service is utilized for treatment of this patient, the contents of this document represent the material reviewed with the patient via the .     OBJECTIVE    Therapeutic Procedures:  Tx Min Billable or 1:1 Min (if diff from Tx Min) Procedure, Rationale, Specifics   20  18821 Therapeutic Exercise (timed):  increase ROM, strength, coordination, balance, and proprioception to improve patient's ability to progress to PLOF and address remaining functional goals. (see flow sheet as applicable)    Details if applicable:       10  18687 Neuromuscular Re-Education (timed):  improve balance, coordination, kinesthetic sense, posture, core stability and proprioception to improve patient's ability to develop conscious control of individual muscles and awareness of position of extremities in order to progress to PLOF and address remaining functional goals. (see flow sheet as applicable)    Details if applicable:     10  47789 Therapeutic Activity (timed):  use of dynamic activities replicating functional movements to increase ROM, strength, coordination, balance, and proprioception in order to improve patient's ability to progress to PLOF and address remaining functional goals.  (see flow sheet as applicable)     Details if applicable:     40  [facility] Totals Reminder: bill using total billable min of TIMED therapeutic procedures (example: do not include dry needle or

## 2025-06-09 ENCOUNTER — HOSPITAL ENCOUNTER (OUTPATIENT)
Facility: HOSPITAL | Age: 50
Setting detail: RECURRING SERIES
Discharge: HOME | End: 2025-06-12
Payer: COMMERCIAL

## 2025-06-09 PROCEDURE — 97112 NEUROMUSCULAR REEDUCATION: CPT

## 2025-06-09 PROCEDURE — 97110 THERAPEUTIC EXERCISES: CPT

## 2025-06-09 PROCEDURE — 97530 THERAPEUTIC ACTIVITIES: CPT

## 2025-06-09 NOTE — PROGRESS NOTES
program.                 Status at IE: NA                 Current:In-progress, advanced HEP provided resistance band, 6/9/2025                    Patient will increase bilateral LE strength to 5/5 MMT throughout to aid in completion of ADLs.                 Status at IE:4/5                 Current:In-progress, 4+/5, 6/4/2025                    Patient will report pain no greater than 0-1/10/10 throughout entire day to aid in completion of ADLs.                 Status at IE:0-6/10                 Current:Met, 5/22/2025                    Patent will be able to sit for 60 mins to be able to perform work place activities and drive to appointments and complete ADLs.                 Status at IE:Pain with prolonged sitting                 Current:Met, 6/9/2025                    Patient will improve CHIQUITA score by 13 points to demonstrate improvement in functional status.                 Status at IE:CHIQUITA score = 20 (an established functional score where 100 = total disability)                 Current: CHIQUITA 16% 5/5/25    PLAN  Yes  Continue plan of care  []  Upgrade activities as tolerated  []  Discharge due to :  []  Other:    Brown Calvo PT    6/9/2025    10:08 AM    Future Appointments   Date Time Provider Department Center   6/11/2025  9:30 AM Brown Calvo, PT [facility]   6/16/2025  8:50 AM Nacho Sanchez PTA [facility]

## 2025-06-11 ENCOUNTER — HOSPITAL ENCOUNTER (OUTPATIENT)
Facility: HOSPITAL | Age: 50
Setting detail: RECURRING SERIES
Discharge: HOME | End: 2025-06-14
Payer: COMMERCIAL

## 2025-06-11 PROCEDURE — 97110 THERAPEUTIC EXERCISES: CPT

## 2025-06-11 PROCEDURE — 97112 NEUROMUSCULAR REEDUCATION: CPT

## 2025-06-11 PROCEDURE — 97530 THERAPEUTIC ACTIVITIES: CPT

## 2025-06-11 NOTE — PROGRESS NOTES
PHYSICAL / OCCUPATIONAL THERAPY - DAILY TREATMENT NOTE     Patient Name: Magdalena Arriola    Date: 2025    : 1975  Insurance: Payor: VINNIE PATEL / Plan: [facility] [place] / Product Type: *No Product type* /      Patient  verified Yes     Visit #   Current / Total 15 16   Time   In / Out 0930 1010   Pain   In / Out 0 0   Subjective Functional Status/Changes: \"I am getting better with exercise in the gym. I would like to reviewe a few of them..\"   Changes to:  Allergies, Med Hx, Sx Hx?   no       TREATMENT AREA =  Low back pain, unspecified [M54.50]  Other low back pain [M54.59]    If an interpreting service is utilized for treatment of this patient, the contents of this document represent the material reviewed with the patient via the .     OBJECTIVE    Therapeutic Procedures:  Tx Min Billable or 1:1 Min (if diff from Tx Min) Procedure, Rationale, Specifics   30  23300 Therapeutic Exercise (timed):  increase ROM, strength, coordination, balance, and proprioception to improve patient's ability to progress to PLOF and address remaining functional goals. (see flow sheet as applicable)    Details if applicable:       0  48189 Neuromuscular Re-Education (timed):  improve balance, coordination, kinesthetic sense, posture, core stability and proprioception to improve patient's ability to develop conscious control of individual muscles and awareness of position of extremities in order to progress to PLOF and address remaining functional goals. (see flow sheet as applicable)    Details if applicable:     10  32668 Therapeutic Activity (timed):  use of dynamic activities replicating functional movements to increase ROM, strength, coordination, balance, and proprioception in order to improve patient's ability to progress to PLOF and address remaining functional goals.  (see flow sheet as applicable)     Details if applicable:     40  [facility] Totals Reminder: bill using total billable min of TIMED therapeutic 
        Status at IE:Pain with prolonged sitting                 Current:In-progress, 45 minutes, 5/13/2025                    Patient will improve CHIQUITA score by 13 points to demonstrate improvement in functional status.                 Status at IE:CHIQUITA score = 20 (an established functional score where 100 = total disability)                 Current: CHIQUITA 16% 5/5/25    Assessment/ Summary of Care:     Patient is improving as she has met 2/2 STGs and is progressing toward her remaining goals. She is compliant with initial HEP. She is demonstrates improved spine mechanics when bending and lifting. She is developing strength and functional mobility but is still limited. Will continue to focus on strength and will introduce machines via the local gym in future visits. Provided HEP and resistance band to promote a seamless transition to independent management of her condition.       RECOMMENDATIONS:  [x]Discontinue therapy: [x]Patient has reached or is progressing toward set goals      []Patient is non-compliant or has abdicated      []Due to lack of appreciable progress towards set goals    Brown Calvo, PT 6/11/2025 10:06 AM

## 2025-06-16 ENCOUNTER — APPOINTMENT (OUTPATIENT)
Facility: HOSPITAL | Age: 50
End: 2025-06-16
Payer: COMMERCIAL